# Patient Record
Sex: FEMALE | Race: WHITE | NOT HISPANIC OR LATINO
[De-identification: names, ages, dates, MRNs, and addresses within clinical notes are randomized per-mention and may not be internally consistent; named-entity substitution may affect disease eponyms.]

---

## 2017-11-26 ENCOUNTER — TRANSCRIPTION ENCOUNTER (OUTPATIENT)
Age: 27
End: 2017-11-26

## 2018-11-18 ENCOUNTER — TRANSCRIPTION ENCOUNTER (OUTPATIENT)
Age: 28
End: 2018-11-18

## 2020-11-04 ENCOUNTER — EMERGENCY (EMERGENCY)
Facility: HOSPITAL | Age: 30
LOS: 0 days | Discharge: HOME | End: 2020-11-04
Payer: COMMERCIAL

## 2020-11-04 VITALS
RESPIRATION RATE: 16 BRPM | OXYGEN SATURATION: 99 % | TEMPERATURE: 99 F | SYSTOLIC BLOOD PRESSURE: 114 MMHG | HEART RATE: 77 BPM | DIASTOLIC BLOOD PRESSURE: 64 MMHG

## 2020-11-04 DIAGNOSIS — Z20.828 CONTACT WITH AND (SUSPECTED) EXPOSURE TO OTHER VIRAL COMMUNICABLE DISEASES: ICD-10-CM

## 2020-11-04 LAB — SARS-COV-2 RNA SPEC QL NAA+PROBE: SIGNIFICANT CHANGE UP

## 2020-11-04 PROCEDURE — 99283 EMERGENCY DEPT VISIT LOW MDM: CPT

## 2020-11-04 PROCEDURE — U0003: CPT

## 2020-11-04 NOTE — ED STATDOCS - PATIENT PORTAL LINK FT
You can access the FollowMyHealth Patient Portal offered by [facility] by registering at the following website: [email or web address]. By joining [facility]’s FollowMyHealth portal, you will also be able to view your health information using other applications (apps) compatible with our system.

## 2020-11-04 NOTE — ED ADULT TRIAGE NOTE - CAS ELECT INFOMATION PROVIDED
DC instructions/DISCHARGE INSTRUCTIONS REVIEWED WITH PATIENT VERBALLY, PT VERBALIZED UNDERSTANDING OF DISCHARGE INSTRUCTIONS. PAPER COPY OF DISCHARGE INSTRUCTIONS GIVEN TO PATIENT WITH SELF FELISHA AND COVID 19 INFORMATION.

## 2020-11-04 NOTE — ED ADULT TRIAGE NOTE - CHIEF COMPLAINT QUOTE
Patient requesting covid testing for travel return  PATIENT WAS TREATED, EVALUATED AND DISCHARGED BY INTAKE PROVIDER. PLEASE SEE PROVIDER NOTE FOR ASSESSMENT.

## 2020-12-02 ENCOUNTER — OUTPATIENT (OUTPATIENT)
Dept: OUTPATIENT SERVICES | Facility: HOSPITAL | Age: 30
LOS: 1 days | End: 2020-12-02
Payer: COMMERCIAL

## 2020-12-02 DIAGNOSIS — Z11.59 ENCOUNTER FOR SCREENING FOR OTHER VIRAL DISEASES: ICD-10-CM

## 2020-12-02 PROBLEM — Z78.9 OTHER SPECIFIED HEALTH STATUS: Chronic | Status: ACTIVE | Noted: 2020-11-04

## 2020-12-02 LAB — SARS-COV-2 RNA SPEC QL NAA+PROBE: SIGNIFICANT CHANGE UP

## 2020-12-02 PROCEDURE — U0003: CPT

## 2020-12-03 DIAGNOSIS — Z11.59 ENCOUNTER FOR SCREENING FOR OTHER VIRAL DISEASES: ICD-10-CM

## 2021-02-13 ENCOUNTER — APPOINTMENT (OUTPATIENT)
Dept: RADIOLOGY | Facility: CLINIC | Age: 31
End: 2021-02-13
Payer: MEDICAID

## 2021-02-13 ENCOUNTER — TRANSCRIPTION ENCOUNTER (OUTPATIENT)
Age: 31
End: 2021-02-13

## 2021-02-13 ENCOUNTER — OUTPATIENT (OUTPATIENT)
Dept: OUTPATIENT SERVICES | Facility: HOSPITAL | Age: 31
LOS: 1 days | End: 2021-02-13
Payer: MEDICAID

## 2021-02-13 DIAGNOSIS — S29.9XXA UNSPECIFIED INJURY OF THORAX, INITIAL ENCOUNTER: ICD-10-CM

## 2021-02-13 PROCEDURE — 71100 X-RAY EXAM RIBS UNI 2 VIEWS: CPT

## 2021-02-13 PROCEDURE — 71100 X-RAY EXAM RIBS UNI 2 VIEWS: CPT | Mod: 26,LT

## 2021-02-19 ENCOUNTER — TRANSCRIPTION ENCOUNTER (OUTPATIENT)
Age: 31
End: 2021-02-19

## 2021-05-07 ENCOUNTER — NON-APPOINTMENT (OUTPATIENT)
Age: 31
End: 2021-05-07

## 2021-07-06 ENCOUNTER — APPOINTMENT (OUTPATIENT)
Dept: OPHTHALMOLOGY | Facility: CLINIC | Age: 31
End: 2021-07-06
Payer: MEDICAID

## 2021-07-06 ENCOUNTER — NON-APPOINTMENT (OUTPATIENT)
Age: 31
End: 2021-07-06

## 2021-07-06 PROCEDURE — 92015 DETERMINE REFRACTIVE STATE: CPT

## 2021-07-06 PROCEDURE — 92014 COMPRE OPH EXAM EST PT 1/>: CPT

## 2021-12-11 ENCOUNTER — TRANSCRIPTION ENCOUNTER (OUTPATIENT)
Age: 31
End: 2021-12-11

## 2022-07-24 ENCOUNTER — NON-APPOINTMENT (OUTPATIENT)
Age: 32
End: 2022-07-24

## 2023-04-07 ENCOUNTER — APPOINTMENT (OUTPATIENT)
Dept: OPHTHALMOLOGY | Facility: CLINIC | Age: 33
End: 2023-04-07

## 2023-05-31 ENCOUNTER — NON-APPOINTMENT (OUTPATIENT)
Age: 33
End: 2023-05-31

## 2023-06-23 ENCOUNTER — NON-APPOINTMENT (OUTPATIENT)
Age: 33
End: 2023-06-23

## 2023-06-23 ENCOUNTER — APPOINTMENT (OUTPATIENT)
Dept: OPHTHALMOLOGY | Facility: CLINIC | Age: 33
End: 2023-06-23
Payer: COMMERCIAL

## 2023-06-23 PROCEDURE — 92014 COMPRE OPH EXAM EST PT 1/>: CPT

## 2024-09-21 ENCOUNTER — EMERGENCY (EMERGENCY)
Facility: HOSPITAL | Age: 34
LOS: 1 days | Discharge: PSYCHIATRIC OR SUBSTANCE USE FACILITY | End: 2024-09-21
Attending: EMERGENCY MEDICINE
Payer: SELF-PAY

## 2024-09-21 VITALS
SYSTOLIC BLOOD PRESSURE: 114 MMHG | HEART RATE: 97 BPM | HEIGHT: 64 IN | DIASTOLIC BLOOD PRESSURE: 83 MMHG | OXYGEN SATURATION: 97 % | RESPIRATION RATE: 19 BRPM | WEIGHT: 169.98 LBS | TEMPERATURE: 99 F

## 2024-09-21 LAB
ALBUMIN SERPL ELPH-MCNC: 4.7 G/DL — SIGNIFICANT CHANGE UP (ref 3.3–5)
ALP SERPL-CCNC: 61 U/L — SIGNIFICANT CHANGE UP (ref 40–120)
ALT FLD-CCNC: 17 U/L — SIGNIFICANT CHANGE UP (ref 10–45)
AMPHET UR-MCNC: NEGATIVE — SIGNIFICANT CHANGE UP
ANION GAP SERPL CALC-SCNC: 16 MMOL/L — SIGNIFICANT CHANGE UP (ref 5–17)
APAP SERPL-MCNC: <15 UG/ML — SIGNIFICANT CHANGE UP (ref 10–30)
APPEARANCE UR: ABNORMAL
AST SERPL-CCNC: 17 U/L — SIGNIFICANT CHANGE UP (ref 10–40)
BACTERIA # UR AUTO: ABNORMAL /HPF
BARBITURATES UR SCN-MCNC: NEGATIVE — SIGNIFICANT CHANGE UP
BASOPHILS # BLD AUTO: 0 K/UL — SIGNIFICANT CHANGE UP (ref 0–0.2)
BASOPHILS NFR BLD AUTO: 0 % — SIGNIFICANT CHANGE UP (ref 0–2)
BENZODIAZ UR-MCNC: NEGATIVE — SIGNIFICANT CHANGE UP
BILIRUB SERPL-MCNC: 0.9 MG/DL — SIGNIFICANT CHANGE UP (ref 0.2–1.2)
BILIRUB UR-MCNC: NEGATIVE — SIGNIFICANT CHANGE UP
BUN SERPL-MCNC: 18 MG/DL — SIGNIFICANT CHANGE UP (ref 7–23)
CALCIUM SERPL-MCNC: 10.1 MG/DL — SIGNIFICANT CHANGE UP (ref 8.4–10.5)
CAST: 18 /LPF — HIGH (ref 0–4)
CHLORIDE SERPL-SCNC: 104 MMOL/L — SIGNIFICANT CHANGE UP (ref 96–108)
CO2 SERPL-SCNC: 18 MMOL/L — LOW (ref 22–31)
COCAINE METAB.OTHER UR-MCNC: NEGATIVE — SIGNIFICANT CHANGE UP
COLOR SPEC: YELLOW — SIGNIFICANT CHANGE UP
CREAT SERPL-MCNC: 0.82 MG/DL — SIGNIFICANT CHANGE UP (ref 0.5–1.3)
DIFF PNL FLD: NEGATIVE — SIGNIFICANT CHANGE UP
EGFR: 97 ML/MIN/1.73M2 — SIGNIFICANT CHANGE UP
EOSINOPHIL # BLD AUTO: 0.13 K/UL — SIGNIFICANT CHANGE UP (ref 0–0.5)
EOSINOPHIL NFR BLD AUTO: 0.9 % — SIGNIFICANT CHANGE UP (ref 0–6)
ETHANOL SERPL-MCNC: <10 MG/DL — SIGNIFICANT CHANGE UP (ref 0–10)
FLUAV AG NPH QL: SIGNIFICANT CHANGE UP
FLUBV AG NPH QL: SIGNIFICANT CHANGE UP
GLUCOSE SERPL-MCNC: 148 MG/DL — HIGH (ref 70–99)
GLUCOSE UR QL: NEGATIVE MG/DL — SIGNIFICANT CHANGE UP
HCG UR QL: NEGATIVE — SIGNIFICANT CHANGE UP
HCT VFR BLD CALC: 41 % — SIGNIFICANT CHANGE UP (ref 34.5–45)
HGB BLD-MCNC: 13.8 G/DL — SIGNIFICANT CHANGE UP (ref 11.5–15.5)
HYALINE CASTS # UR AUTO: PRESENT
KETONES UR-MCNC: 40 MG/DL
LEUKOCYTE ESTERASE UR-ACNC: ABNORMAL
LYMPHOCYTES # BLD AUTO: 36.2 % — SIGNIFICANT CHANGE UP (ref 13–44)
LYMPHOCYTES # BLD AUTO: 5.32 K/UL — HIGH (ref 1–3.3)
MANUAL SMEAR VERIFICATION: SIGNIFICANT CHANGE UP
MCHC RBC-ENTMCNC: 28.9 PG — SIGNIFICANT CHANGE UP (ref 27–34)
MCHC RBC-ENTMCNC: 33.7 GM/DL — SIGNIFICANT CHANGE UP (ref 32–36)
MCV RBC AUTO: 85.8 FL — SIGNIFICANT CHANGE UP (ref 80–100)
METHADONE UR-MCNC: NEGATIVE — SIGNIFICANT CHANGE UP
MONOCYTES # BLD AUTO: 0.76 K/UL — SIGNIFICANT CHANGE UP (ref 0–0.9)
MONOCYTES NFR BLD AUTO: 5.2 % — SIGNIFICANT CHANGE UP (ref 2–14)
MUCOUS THREADS # UR AUTO: PRESENT
NEUTROPHILS # BLD AUTO: 8.48 K/UL — HIGH (ref 1.8–7.4)
NEUTROPHILS NFR BLD AUTO: 57.7 % — SIGNIFICANT CHANGE UP (ref 43–77)
NITRITE UR-MCNC: NEGATIVE — SIGNIFICANT CHANGE UP
OPIATES UR-MCNC: NEGATIVE — SIGNIFICANT CHANGE UP
OXYCODONE UR-MCNC: NEGATIVE — SIGNIFICANT CHANGE UP
PCP SPEC-MCNC: SIGNIFICANT CHANGE UP
PCP UR-MCNC: NEGATIVE — SIGNIFICANT CHANGE UP
PH UR: 6 — SIGNIFICANT CHANGE UP (ref 5–8)
PLAT MORPH BLD: ABNORMAL
PLATELET # BLD AUTO: 215 K/UL — SIGNIFICANT CHANGE UP (ref 150–400)
POTASSIUM SERPL-MCNC: 3.4 MMOL/L — LOW (ref 3.5–5.3)
POTASSIUM SERPL-SCNC: 3.4 MMOL/L — LOW (ref 3.5–5.3)
PROT SERPL-MCNC: 7.8 G/DL — SIGNIFICANT CHANGE UP (ref 6–8.3)
PROT UR-MCNC: SIGNIFICANT CHANGE UP MG/DL
RBC # BLD: 4.78 M/UL — SIGNIFICANT CHANGE UP (ref 3.8–5.2)
RBC # FLD: 12.4 % — SIGNIFICANT CHANGE UP (ref 10.3–14.5)
RBC BLD AUTO: SIGNIFICANT CHANGE UP
RBC CASTS # UR COMP ASSIST: 10 /HPF — HIGH (ref 0–4)
REVIEW: SIGNIFICANT CHANGE UP
RSV RNA NPH QL NAA+NON-PROBE: SIGNIFICANT CHANGE UP
SALICYLATES SERPL-MCNC: <2 MG/DL — LOW (ref 15–30)
SARS-COV-2 RNA SPEC QL NAA+PROBE: SIGNIFICANT CHANGE UP
SODIUM SERPL-SCNC: 138 MMOL/L — SIGNIFICANT CHANGE UP (ref 135–145)
SP GR SPEC: 1.03 — SIGNIFICANT CHANGE UP (ref 1–1.03)
SQUAMOUS # UR AUTO: 8 /HPF — HIGH (ref 0–5)
THC UR QL: NEGATIVE — SIGNIFICANT CHANGE UP
UROBILINOGEN FLD QL: 1 MG/DL — SIGNIFICANT CHANGE UP (ref 0.2–1)
WAXY CASTS # URNS: PRESENT
WBC # BLD: 14.7 K/UL — HIGH (ref 3.8–10.5)
WBC # FLD AUTO: 14.7 K/UL — HIGH (ref 3.8–10.5)
WBC UR QL: 8 /HPF — HIGH (ref 0–5)

## 2024-09-21 PROCEDURE — 84443 ASSAY THYROID STIM HORMONE: CPT

## 2024-09-21 PROCEDURE — 87637 SARSCOV2&INF A&B&RSV AMP PRB: CPT

## 2024-09-21 PROCEDURE — 70450 CT HEAD/BRAIN W/O DYE: CPT | Mod: MC

## 2024-09-21 PROCEDURE — 85025 COMPLETE CBC W/AUTO DIFF WBC: CPT

## 2024-09-21 PROCEDURE — 80053 COMPREHEN METABOLIC PANEL: CPT

## 2024-09-21 PROCEDURE — 81001 URINALYSIS AUTO W/SCOPE: CPT

## 2024-09-21 PROCEDURE — 99285 EMERGENCY DEPT VISIT HI MDM: CPT | Mod: 25

## 2024-09-21 PROCEDURE — 93005 ELECTROCARDIOGRAM TRACING: CPT

## 2024-09-21 PROCEDURE — 80307 DRUG TEST PRSMV CHEM ANLYZR: CPT

## 2024-09-21 PROCEDURE — 81025 URINE PREGNANCY TEST: CPT

## 2024-09-21 PROCEDURE — 99285 EMERGENCY DEPT VISIT HI MDM: CPT
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
## 2024-09-21 NOTE — ED PROVIDER NOTE - OBJECTIVE STATEMENT
34-year-old female, with history of depression, presenting with behavioral changes that have been progressively worsening.  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  Patient has been disorganized and agitated at home. Has not been eating and drinking well per parents. Worsening this week.
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
## 2024-09-21 NOTE — ED BEHAVIORAL HEALTH ASSESSMENT NOTE - CURRENT ACTIVE IDEATION
Reason for Call:  Other call back    Detailed comments: patient is calling because she would like to know if she needs an office visit to get refills on medications. Please follow up with patient.     Phone Number Patient can be reached at: Cell number on file:    Telephone Information:   Mobile [phone]       Best Time: any    Can we leave a detailed message on this number? YES    Call taken on 6/22/2021 at 9:13 AM by Carolyn Moses       No
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
## 2024-09-21 NOTE — ED PROVIDER NOTE - CARE PLAN
1 Principal Discharge DX:	Physically aggressive behavior  Secondary Diagnosis:	Disorganized behavior

## 2024-09-21 NOTE — ED ADULT TRIAGE NOTE - CHIEF COMPLAINT QUOTE
Verbally  disruptive  at home,  patient  has  not  been  taking  her  psych  medication. Patient  denies  suicidal ideations.

## 2024-09-21 NOTE — ED BEHAVIORAL HEALTH ASSESSMENT NOTE - NSBHSAALC_PSY_A_CORE FT
says she used to drink in the past but quantifies as 2 glasses of wine, also affirms drinking a whole bottle of wine a night, disorganized

## 2024-09-21 NOTE — ED PROVIDER NOTE - PHYSICAL EXAMINATION
GENERAL: NAD  HEAD:  Atraumatic, Normocephalic  EYES: EOMI, conjunctiva and sclera clear  ENT: Moist mucous membranes  CHEST/LUNG: Unlabored respirations.  EXTREMITIES:  No cyanosis or edema.   NERVOUS SYSTEM:  No focal deficits.   PSYCH: poor eye contact, intermittently answers questions directly, intermittently disorganized speech. Pacing

## 2024-09-21 NOTE — ED BEHAVIORAL HEALTH ASSESSMENT NOTE - PSYCHIATRIC ISSUES AND PLAN (INCLUDE STANDING AND PRN MEDICATION)
deferred to inpt team, consider starting aripiprazole 2 mg qHS and thorazine 50 mg / ativan 2 mg q6h PO/IM PRN for agitation/anxiety

## 2024-09-21 NOTE — ED BEHAVIORAL HEALTH ASSESSMENT NOTE - LEVEL OF CONSCIOUSNESS
Azithromycin Pregnancy And Lactation Text: This medication is considered safe during pregnancy and is also secreted in breast milk. Alert
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
## 2024-09-21 NOTE — ED BEHAVIORAL HEALTH ASSESSMENT NOTE - RISK ASSESSMENT
Pt is at chronically elevated risk of suicide and violence due to documented risk factors and presenting with acute risk factors as identified to be addressed via treatment.

## 2024-09-21 NOTE — ED ADULT NURSE NOTE - NSFALLUNIVINTERV_ED_ALL_ED
Bed/Stretcher in lowest position, wheels locked, appropriate side rails in place/Call bell, personal items and telephone in reach/Instruct patient to call for assistance before getting out of bed/chair/stretcher/Non-slip footwear applied when patient is off stretcher/[place] to call system/Physically safe environment - no spills, clutter or unnecessary equipment/Purposeful proactive rounding/Room/bathroom lighting operational, light cord in reach

## 2024-09-21 NOTE — ED BEHAVIORAL HEALTH ASSESSMENT NOTE - DESCRIPTION
disorganized, pacing    Vital Signs Last 24 Hrs  T(C): 36.9 (09-21-24 @ 21:16), Max: 37.2 (09-21-24 @ 16:23)  T(F): 98.4 (09-21-24 @ 21:16), Max: 99 (09-21-24 @ 16:23)  HR: 96 (09-21-24 @ 21:16) (96 - 97)  BP: 108/77 (09-21-24 @ 21:16) (108/77 - 114/83)  BP(mean): --  RR: 18 (09-21-24 @ 21:16) (18 - 19)  SpO2: 98% (09-21-24 @ 21:16) (97% - 98%) none reported as above

## 2024-09-22 ENCOUNTER — INPATIENT (INPATIENT)
Facility: HOSPITAL | Age: 34
LOS: 11 days | Discharge: HOME | End: 2024-10-04
Attending: STUDENT IN AN ORGANIZED HEALTH CARE EDUCATION/TRAINING PROGRAM | Admitting: PSYCHIATRY & NEUROLOGY
Payer: MEDICAID

## 2024-09-22 VITALS
TEMPERATURE: 98 F | HEART RATE: 69 BPM | SYSTOLIC BLOOD PRESSURE: 125 MMHG | DIASTOLIC BLOOD PRESSURE: 88 MMHG | OXYGEN SATURATION: 98 % | RESPIRATION RATE: 18 BRPM

## 2024-09-22 VITALS — RESPIRATION RATE: 17 BRPM | WEIGHT: 147.71 LBS | TEMPERATURE: 98 F | OXYGEN SATURATION: 99 %

## 2024-09-22 DIAGNOSIS — F31.62 BIPOLAR DISORDER, CURRENT EPISODE MIXED, MODERATE: ICD-10-CM

## 2024-09-22 DIAGNOSIS — F29 UNSPECIFIED PSYCHOSIS NOT DUE TO A SUBSTANCE OR KNOWN PHYSIOLOGICAL CONDITION: ICD-10-CM

## 2024-09-22 LAB — TSH SERPL-MCNC: 1.42 UIU/ML — SIGNIFICANT CHANGE UP (ref 0.27–4.2)

## 2024-09-22 PROCEDURE — 90792 PSYCH DIAG EVAL W/MED SRVCS: CPT | Mod: 95

## 2024-09-22 PROCEDURE — 70450 CT HEAD/BRAIN W/O DYE: CPT | Mod: 26,MC

## 2024-09-22 RX ORDER — ARIPIPRAZOLE 5 MG/1
5 TABLET ORAL AT BEDTIME
Refills: 0 | Status: DISCONTINUED | OUTPATIENT
Start: 2024-09-22 | End: 2024-09-23

## 2024-09-22 RX ORDER — MAG HYDROX/ALUMINUM HYD/SIMETH 200-200-20
30 SUSPENSION, ORAL (FINAL DOSE FORM) ORAL EVERY 4 HOURS
Refills: 0 | Status: DISCONTINUED | OUTPATIENT
Start: 2024-09-22 | End: 2024-10-04

## 2024-09-22 RX ORDER — HALOPERIDOL LACTATE 2 MG/ML
5 CONCENTRATE, ORAL ORAL EVERY 6 HOURS
Refills: 0 | Status: DISCONTINUED | OUTPATIENT
Start: 2024-09-22 | End: 2024-09-24

## 2024-09-22 RX ORDER — DIPHENHYDRAMINE HCL 12.5MG/5ML
25 LIQUID (ML) ORAL EVERY 6 HOURS
Refills: 0 | Status: DISCONTINUED | OUTPATIENT
Start: 2024-09-22 | End: 2024-09-23

## 2024-09-22 RX ORDER — INFLUENZA VIRUS VACCINE 15; 15; 15; 15 UG/.5ML; UG/.5ML; UG/.5ML; UG/.5ML
0.5 SUSPENSION INTRAMUSCULAR ONCE
Refills: 0 | Status: COMPLETED | OUTPATIENT
Start: 2024-09-22 | End: 2024-09-22

## 2024-09-22 RX ORDER — ACETAMINOPHEN 325 MG
650 TABLET ORAL EVERY 6 HOURS
Refills: 0 | Status: DISCONTINUED | OUTPATIENT
Start: 2024-09-22 | End: 2024-10-04

## 2024-09-22 RX ORDER — 5-HYDROXYTRYPTOPHAN (5-HTP) 100 MG
3 TABLET,DISINTEGRATING ORAL AT BEDTIME
Refills: 0 | Status: DISCONTINUED | OUTPATIENT
Start: 2024-09-22 | End: 2024-10-04

## 2024-09-22 RX ORDER — HALOPERIDOL LACTATE 2 MG/ML
5 CONCENTRATE, ORAL ORAL ONCE
Refills: 0 | Status: DISCONTINUED | OUTPATIENT
Start: 2024-09-22 | End: 2024-10-04

## 2024-09-22 RX ORDER — DIPHENHYDRAMINE HCL 12.5MG/5ML
25 LIQUID (ML) ORAL ONCE
Refills: 0 | Status: DISCONTINUED | OUTPATIENT
Start: 2024-09-22 | End: 2024-09-23

## 2024-09-22 RX ADMIN — Medication 2 MILLIGRAM(S): at 07:06

## 2024-09-22 RX ADMIN — ARIPIPRAZOLE 5 MILLIGRAM(S): 5 TABLET ORAL at 20:11

## 2024-09-22 RX ADMIN — Medication 5 MILLIGRAM(S): at 07:06

## 2024-09-22 NOTE — BH PATIENT PROFILE - FALL HARM RISK - CONCLUSION
Universal Safety Interventions Spoke to ENT - rec'd treating with augmentin and f/u w/ ENT in 1 week. Nasal spray administered in the ED and given to family.   Linda Linn MD Small plastic cellphone chip retrieved from left nostril using Velázquez extractor. Spoke to ENT - rec'd treating with augmentin and f/u w/ ENT in 1 week. Nasal spray administered in the ED and given to family.   Linda Linn MD
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
## 2024-09-22 NOTE — PSYCHIATRIC REHAB INITIAL EVALUATION - NSBHPRRECOMMEND_PSY_ALL_CORE
Patient is a 34-year-old female admitted to [facility] for agitation and disorganization at home. Upon initial session, patient was calm/compliant and friendly upon approach. Patient was oriented to unit 3 as well as the role/function of the psychiatric rehab department. Patient verbalized understanding of the services provided as it pertains to engagement and experience.   Patient reported feeling “overwhelmed and stressed out” due to various life related factors and displayed a tired flat affect. Patient detailed difficulties with finding motivation to perform executive functioning tasks. Collateral information in patient’s chart indicated that she had been presenting with psychosis like symptoms and agitation. During session, patient’s tone of voice was often low and hard to make out in addition to presenting with pressured speech and disorganization. Patient listed cooking, exercising on a regular basis, and hanging out with friends as current coping skills to utilize.   Based on content of conversation, writer determined that patient would benefit from a treatment goal focused on disorganization symptoms.  Psychiatric Rehabilitation Staff plan to meet with patient weekly to review progress towards identified goal.

## 2024-09-22 NOTE — BH INPATIENT PSYCHIATRY ASSESSMENT NOTE - NSICDXBHTERTIARYDX_PSY_ALL_CORE
R/O Bipolar 1 disorder   F31.9  R/O Major depression with psychotic features   F32.3  R/O Severe recurrent major depression without psychotic features   F33.2

## 2024-09-22 NOTE — BH INPATIENT PSYCHIATRY ASSESSMENT NOTE - NSBHCHARTREVIEWVS_PSY_A_CORE FT
Vital Signs Last 24 Hrs  T(C): 36.6 (09-22-24 @ 05:47), Max: 37.2 (09-21-24 @ 16:23)  T(F): 97.8 (09-22-24 @ 05:47), Max: 99 (09-21-24 @ 16:23)  HR: 69 (09-22-24 @ 04:42) (69 - 97)  BP: 125/88 (09-22-24 @ 04:42) (108/77 - 144/93)  BP(mean): 110 (09-22-24 @ 01:04) (110 - 110)  RR: 17 (09-22-24 @ 05:47) (17 - 19)  SpO2: 99% (09-22-24 @ 05:47) (97% - 99%)    Orthostatic VS  09-22-24 @ 05:47  Lying BP: --/-- HR: --  Sitting BP: 140/90 HR: 90  Standing BP: 129/101 HR: 85  Site: upper left arm  Mode: electronic

## 2024-09-22 NOTE — ED ADULT NURSE REASSESSMENT NOTE - NS ED NURSE REASSESS COMMENT FT1
pt. has been accepted @ [place] [address] on a voluntary status and report given MONIKA Matias. [facility] ems was called for transport. 1:1 CO for psychosis/disorganized behavior maintained. pt. was made aware of pending transfer via ems to [place] [address].
pt. was transferred to [facility], [facility] on a voluntary status via ems and she was cooperative w/ transfer. final report given to MONIKA Matias regarding pt's current mental status and behavior. pt's mother was notified.
received pt. awake, labile and irritable @ times, but was cooperative w/ blood draw. 1:1 CO for psychosis maintained.
5
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
## 2024-09-22 NOTE — BH INPATIENT PSYCHIATRY ASSESSMENT NOTE - NSBHATTESTATTENDBILLTIME_PSY_A_CORE
I attest my time as attending is greater than NIRU time spent on qualifying patient care activities.
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
## 2024-09-22 NOTE — CHART NOTE - NSCHARTNOTEFT_GEN_A_CORE
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 Patient denies current use of nicotine/ tobacco, or other substances.    FAMILY HISTORY:  No pertinent family history in first degree relatives      MEDICATIONS  (STANDING):  ARIPiprazole 5 milliGRAM(s) Oral at bedtime  influenza   Vaccine 0.5 milliLiter(s) IntraMuscular once    MEDICATIONS  (PRN):  acetaminophen     Tablet .. 650 milliGRAM(s) Oral every 6 hours PRN Mild Pain (1 - 3), Moderate Pain (4 - 6)  aluminum hydroxide/magnesium hydroxide/simethicone Suspension 30 milliLiter(s) Oral every 4 hours PRN Dyspepsia  diphenhydrAMINE 25 milliGRAM(s) Oral every 6 hours PRN agitation  diphenhydrAMINE Injectable 25 milliGRAM(s) IntraMuscular once PRN severe agitation  haloperidol     Tablet 5 milliGRAM(s) Oral every 6 hours PRN psychotic agitation  haloperidol    Injectable 5 milliGRAM(s) IntraMuscular once PRN severe psychotic agitation  LORazepam     Tablet 2 milliGRAM(s) Oral every 6 hours PRN agitation  LORazepam   Injectable 2 milliGRAM(s) IntraMuscular once PRN severe agitation  melatonin. 3 milliGRAM(s) Oral at bedtime PRN Insomnia  polyethylene glycol 3350 17 Gram(s) Oral daily PRN constipation        Vital Signs Last 24 Hrs  T(C): 36.6 (22 Sep 2024 05:47), Max: 36.8 (22 Sep 2024 01:04)  T(F): 97.8 (22 Sep 2024 05:47), Max: 98.2 (22 Sep 2024 01:04)  HR: 69 (22 Sep 2024 04:42) (69 - 71)  BP: 125/88 (22 Sep 2024 04:42) (125/88 - 144/93)  BP(mean): 110 (22 Sep 2024 01:04) (110 - 110)  RR: 17 (22 Sep 2024 05:47) (17 - 18)  SpO2: 99% (22 Sep 2024 05:47) (98% - 99%)      CAPILLARY BLOOD GLUCOSE        PHYSICAL EXAM:  GENERAL: NAD, well-developed  HEAD:  Atraumatic, Normocephalic  EYES: EOMI, PERRLA, conjunctiva and sclera clear  NECK: Supple, No JVD  CHEST/LUNG: Clear to auscultation bilaterally; No wheeze  HEART: Regular rate and rhythm; No murmurs, rubs, or gallops  ABDOMEN: Soft, Nontender, Nondistended; Bowel sounds present  EXTREMITIES:  2+ Peripheral Pulses, No clubbing, cyanosis, or edema  PSYCH: AAOx3  NEUROLOGY: non-focal  SKIN: No rashes or lesions    LABS:                        13.8   14.70 )-----------( 215      ( 21 Sep 2024 20:15 )             41.0         138  |  104  |  18  ----------------------------<  148[H]  3.4[L]   |  18[L]  |  0.82    Ca    10.1      21 Sep 2024 20:15    TPro  7.8  /  Alb  4.7  /  TBili  0.9  /  DBili  x   /  AST  17  /  ALT  17  /  AlkPhos  61            Urinalysis Basic - ( 21 Sep 2024 22:18 )    Color: Yellow / Appearance: Cloudy / S.028 / pH: x  Gluc: x / Ketone: 40 mg/dL  / Bili: Negative / Urobili: 1.0 mg/dL   Blood: x / Protein: Trace mg/dL / Nitrite: Negative   Leuk Esterase: Trace / RBC: 10 /HPF / WBC 8 /HPF   Sq Epi: x / Non Sq Epi: 8 /HPF / Bacteria: Moderate /HPF        RADIOLOGY & ADDITIONAL TESTS:      Assessment and Plan:  34F admitted to [facility] for Non-organic psychosis w/ no PMHx seen at bedside for medical screening evaluation. Patient has no acute medical complaints at this time. Patient denies fever, chills, headache, dizziness, lightheadedness, N/V, SOB, cough, congestion, chest pain, abdominal pain, dysuria, hematuria, diarrhea, constipation. Physical exam unremarkable, VSS. Labs on 24 with elevated WBC 14.70, potassium 3.4; other labs WNL. UA contaminated, will order repeat UA for morning. A1C, CBC, and lipid profile ordered.  F/u EKG in AM.     1.) Non-organic psychosis: Refer to primary team documentation for recs

## 2024-09-22 NOTE — BH INPATIENT PSYCHIATRY ASSESSMENT NOTE - CURRENT MEDICATION
MEDICATIONS  (STANDING):  ARIPiprazole 5 milliGRAM(s) Oral at bedtime  influenza   Vaccine 0.5 milliLiter(s) IntraMuscular once    MEDICATIONS  (PRN):  acetaminophen     Tablet .. 650 milliGRAM(s) Oral every 6 hours PRN Mild Pain (1 - 3), Moderate Pain (4 - 6)  aluminum hydroxide/magnesium hydroxide/simethicone Suspension 30 milliLiter(s) Oral every 4 hours PRN Dyspepsia  diphenhydrAMINE 25 milliGRAM(s) Oral every 6 hours PRN agitation  diphenhydrAMINE Injectable 25 milliGRAM(s) IntraMuscular once PRN severe agitation  haloperidol     Tablet 5 milliGRAM(s) Oral every 6 hours PRN psychotic agitation  haloperidol    Injectable 5 milliGRAM(s) IntraMuscular once PRN severe psychotic agitation  LORazepam     Tablet 2 milliGRAM(s) Oral every 6 hours PRN agitation  LORazepam   Injectable 2 milliGRAM(s) IntraMuscular once PRN severe agitation  melatonin. 3 milliGRAM(s) Oral at bedtime PRN Insomnia  polyethylene glycol 3350 17 Gram(s) Oral daily PRN constipation

## 2024-09-22 NOTE — BH INPATIENT PSYCHIATRY ASSESSMENT NOTE - NSBHMSETHTPROC_PSY_A_CORE
Chief Complaint   Patient presents with    Hypertension    Other     f/u for elevated psa     Cyst     cyst on left thumb       Palmer Dee 69 y.o. male is here for follow-up of hypertension. The patient denies chest pain, chest tightness, shortness of breath or other cardiovascular symptoms suggesting end organ damage from their hypertension. There has been compliance to medications     He had an elevated PSA on his last visit and needs a repeat    He complains of a cyst on his left thumb    Current Outpatient Medications   Medication Sig Dispense Refill    lisinopril-hydrochlorothiazide (PRINZIDE;ZESTORETIC) 20-12.5 MG per tablet Take 1 tablet by mouth daily 90 tablet 3    Multiple Vitamins-Minerals (THERAPEUTIC MULTIVITAMIN-MINERALS) tablet Take 1 tablet by mouth daily.  aspirin 81 MG EC tablet Take 81 mg by mouth daily. No current facility-administered medications for this visit.         Past Medical History:   Diagnosis Date    Abdominal pain, RUQ     Cervical strain     Gastrointestinal bleed     History of shingles     dx [place] [facility] CNP    Hypertension     Obstructive apnea     Obstructive sleep apnea (adult) (pediatric)     Screening PSA (prostate specific antigen) 10/22/2010    Stool blood 9/18/2009           /70   Pulse 72   Wt 266 lb (120.7 kg)   BMI 38.72 kg/m²     General Appearance:  Alert, cooperative, no distress, appears stated age   Head:  Normocephalic, without obvious abnormality, atraumatic   Neck: Supple, symmetrical, trachea midline, no adenopathy, thyroid: not enlarged, symmetric, no tenderness/mass/nodules, no carotid bruit or JVD   Lungs:   Clear to auscultation bilaterally, respirations unlabored   Chest Wall:  No tenderness or deformity   Heart:  Regular rate and rhythm, S1, S2 normal, no murmur, rub or gallop   Abdomen:   Soft, non-tender, bowel sounds active all four quadrants,  no masses, no organomegaly   Skin: Skin color, texture, turgor Other/Unable to assess

## 2024-09-22 NOTE — BH INPATIENT PSYCHIATRY ASSESSMENT NOTE - NS ED BHA HOMICIDALITY PRESENT CURRENT INTENT
EMERGENCY DEPARTMENT HISTORY AND PHYSICAL EXAM    Date: 1/13/2022  Patient Name: Marilee Grimm    History of Presenting Illness     Chief Complaint   Patient presents with    Flank Pain       Vietnamese interpretor used as pt is non-english speaker    History Provided By: Patient    Chief Complaint: left flank pain, LLQ abdominal pain    HPI(Context):   4:42 PM  Susi Grimm is a 62 y.o. female with PMHX of HTN, DMII who presents to the emergency department C/O LLQ abdominal pain. Pain x 2-3 days. Pain radiates from left lumbar back/left flank. Worse with movement and palpation. Pt denies fever, chills, nausea, vomiting, diarrhea, dysuria, hematuria, hx of diverticulitis, COVID exposures, and any other sxs or complaints. Pt is fully vaccinated. Pt notes she is visiting family from [place]    PCP: Scott, MD Odalis        Past History     Past Medical History:  No past medical history on file. Past Surgical History:  No past surgical history on file. Family History:  No family history on file. Social History:  Social History     Tobacco Use    Smoking status: Not on file    Smokeless tobacco: Not on file   Substance Use Topics    Alcohol use: Not on file    Drug use: Not on file       Allergies:  No Known Allergies      Review of Systems   Review of Systems   Constitutional: Negative for chills and fever. Gastrointestinal: Positive for abdominal pain. Negative for constipation, diarrhea, nausea and vomiting. Genitourinary: Negative for dysuria, flank pain and hematuria. Musculoskeletal: Positive for back pain. All other systems reviewed and are negative. Physical Exam     Vitals:    01/13/22 1501   BP: (!) 157/50   Pulse: 62   Resp: 16   Temp: 97.7 °F (36.5 °C)   SpO2: 99%   Weight: 58.5 kg (129 lb)   Height: 4' (1.219 m)     Physical Exam  Vitals and nursing note reviewed. Constitutional:       General: She is not in acute distress.      Appearance: She is well-developed. She is not diaphoretic. Comments:  female in NAD. Alert. HENT:      Head: Normocephalic and atraumatic. Right Ear: External ear normal.      Left Ear: External ear normal. No tenderness. Nose: Nose normal.   Eyes:      General: No scleral icterus. Right eye: No discharge. Left eye: No discharge. Conjunctiva/sclera: Conjunctivae normal.   Cardiovascular:      Rate and Rhythm: Normal rate and regular rhythm. Heart sounds: Normal heart sounds. No murmur heard. No friction rub. No gallop. Pulmonary:      Effort: Pulmonary effort is normal. No tachypnea, accessory muscle usage or respiratory distress. Breath sounds: Normal breath sounds. No decreased breath sounds, wheezing, rhonchi or rales. Abdominal:      Palpations: Abdomen is soft. Tenderness: There is abdominal tenderness in the left lower quadrant. There is no right CVA tenderness, left CVA tenderness, guarding or rebound. Negative signs include Armenta's sign and McBurney's sign. Musculoskeletal:         General: Normal range of motion. Cervical back: Normal range of motion. Skin:     General: Skin is warm and dry. Neurological:      Mental Status: She is alert and oriented to person, place, and time. Psychiatric:         Judgment: Judgment normal.             Diagnostic Study Results     Labs -     No results found for this or any previous visit (from the past 12 hour(s)). CT ABD PELV W CONT   Final Result      No acute intra-abdominal pathology identified. CT Results  (Last 48 hours)               01/13/22 1932  CT ABD PELV W CONT Final result    Impression:      No acute intra-abdominal pathology identified. Narrative:  EXAM: CT of the abdomen and pelvis       INDICATION: Abdominal pain. COMPARISON: None.        TECHNIQUE: Axial CT imaging of the abdomen and pelvis was performed with   intravenous contrast. Multiplanar reformats were generated. One or more dose   reduction techniques were used on this CT: automated exposure control,   adjustment of the mAs and/or kVp according to patient size, and iterative   reconstruction techniques. The specific techniques used on this CT exam have   been documented in the patient's electronic medical record. Digital Imaging and   Communications in Medicine (DICOM) format image data are available to   nonaffiliated external healthcare facilities or entities on a secure, media   free, reciprocally searchable basis with patient authorization for at least a   12-month period after this study. a.       _______________       FINDINGS:       LOWER CHEST: Unremarkable. LIVER, BILIARY: Liver is normal. No biliary dilation. Gallbladder is   unremarkable. PANCREAS: Normal.       SPLEEN: Normal.       ADRENALS: Normal.       KIDNEYS/URETERS/BLADDER: Normal.       PELVIC ORGANS: Unremarkable. VASCULATURE: Unremarkable       LYMPH NODES: No enlarged lymph nodes. GASTROINTESTINAL TRACT: No bowel dilation or wall thickening. Normal appendix. BONES: No acute or aggressive osseous abnormalities identified. OTHER: None.       _______________               CXR Results  (Last 48 hours)    None          Medications given in the ED-  Medications   lactated ringers bolus infusion 1,000 mL (0 mL IntraVENous IV Completed 1/13/22 1908)   ondansetron (ZOFRAN) injection 4 mg (4 mg IntraVENous Given 1/13/22 1808)   morphine injection 4 mg (4 mg IntraVENous Given 1/13/22 1808)   famotidine (PF) (PEPCID) injection 20 mg (20 mg IntraVENous Given 1/13/22 1807)   iopamidoL (ISOVUE 300) 61 % contrast injection 100 mL (100 mL IntraVENous Given 1/13/22 1932)         Medical Decision Making   I am the first provider for this patient. I reviewed the vital signs, available nursing notes, past medical history, past surgical history, family history and social history.     Vital Signs-Reviewed the patient's vital signs.    Pulse Oximetry Analysis - 99% on RA. NORMAL     Records Reviewed: Nursing Notes    Provider Notes (Medical Decision Making): diverticulitis, stone, UTI/pyelo, appy, colitis, gastroenteritis, pancreatitis, hepatitis, MSK    Procedures:  Procedures    ED Course:   4:42 PM Initial assessment performed. The patients presenting problems have been discussed, and they are in agreement with the care plan formulated and outlined with them. I have encouraged them to ask questions as they arise throughout their visit. Diagnosis and Disposition       CT without acute process. Labs reassuring. Pain down to 2/10. Will tx for UTI and await urine culture. Discussed with patient and her daughter ([place] speaker) with  need for FU with GI and colonoscopy if sxs persist as malignancy not ruled out. Reasons to RTED discussed with pt. All questions answered. Pt feels comfortable going home at this time. Pt expressed understanding and she agrees with plan. 1. Abdominal pain, LLQ (left lower quadrant)    2. Acute left flank pain        PLAN:  1. D/C Home  2. Discharge Medication List as of 1/13/2022  8:33 PM      START taking these medications    Details   HYDROcodone-acetaminophen (NORCO) 5-325 mg per tablet Take 1 Tablet by mouth every four (4) hours as needed for Pain for up to 5 days. Max Daily Amount: 6 Tablets., Normal, Disp-12 Tablet, R-0      dicyclomine (BENTYL) 20 mg tablet Take 1 Tablet by mouth every six (6) hours for 5 days. As needed for abdominal pain, Normal, Disp-20 Tablet, R-0      ondansetron hcl (Zofran) 4 mg tablet Take 1 Tablet by mouth every eight (8) hours as needed for Nausea or Vomiting., Normal, Disp-12 Tablet, R-0      cephALEXin (Keflex) 500 mg capsule Take 1 Capsule by mouth two (2) times a day for 7 days. Indications: bacterial urinary tract infection, Normal, Disp-14 Capsule, R-0           3.    Follow-up Information     Follow up With Specialties Details Why Contact Info Laure Calabrese MD Gastroenterology  please follow up and see specialist if symptoms do not resolve [address] [place] [address] [phone]          _______________________________    Attestations: This note is prepared by Wil Zapien PA-C.  _______________________________        Please note that this dictation was completed with [employer], the computer voice recognition software. Quite often unanticipated grammatical, syntax, homophones, and other interpretive errors are inadvertently transcribed by the computer software. Please disregard these errors. Please excuse any errors that have escaped final proofreading. None known

## 2024-09-22 NOTE — BH INPATIENT PSYCHIATRY ASSESSMENT NOTE - RISK ASSESSMENT
Immediate risk is minimized by inpatient admission to safe environment with appropriate supervision and limited access to lethal means. Future risk to be minimized by treatment of acute mood symptoms, maximizing outpatient supports, providing patient education, discussing emergency procedures, and ensuring close follow-up.    Risk factors include: insomnia, increasing agitation and disorganization leading to current hospitalization, not engaged in work or school, h/o inconsistent med adherence  Protective factors include: no current SI/HI, no h/o prior inpatient psychiatric hospitalization, no h/o SA/SIB, no prior h/o violence or substance use, supportive family, no access to firearms    Based on risk assessment evaluation, patient at low acute risk of harm to self or others at this time, does not require 1:1 CO while on the unit at this time.
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
## 2024-09-22 NOTE — BH INPATIENT PSYCHIATRY ASSESSMENT NOTE - NSBHMETABOLIC_PSY_ALL_CORE_FT
BMI: BMI (kg/m2): 29.2 (09-21-24 @ 16:23)  HbA1c:   Glucose:   BP: --Vital Signs Last 24 Hrs  T(C): 36.6 (09-22-24 @ 05:47), Max: 37.2 (09-21-24 @ 16:23)  T(F): 97.8 (09-22-24 @ 05:47), Max: 99 (09-21-24 @ 16:23)  HR: 69 (09-22-24 @ 04:42) (69 - 97)  BP: 125/88 (09-22-24 @ 04:42) (108/77 - 144/93)  BP(mean): 110 (09-22-24 @ 01:04) (110 - 110)  RR: 17 (09-22-24 @ 05:47) (17 - 19)  SpO2: 99% (09-22-24 @ 05:47) (97% - 99%)    Orthostatic VS  09-22-24 @ 05:47  Lying BP: --/-- HR: --  Sitting BP: 140/90 HR: 90  Standing BP: 129/101 HR: 85  Site: upper left arm  Mode: electronic    Lipid Panel:

## 2024-09-23 LAB
A1C WITH ESTIMATED AVERAGE GLUCOSE RESULT: 5.3 % — SIGNIFICANT CHANGE UP (ref 4–5.6)
ALBUMIN SERPL ELPH-MCNC: 4.5 G/DL — SIGNIFICANT CHANGE UP (ref 3.3–5)
ALP SERPL-CCNC: 62 U/L — SIGNIFICANT CHANGE UP (ref 40–120)
ALT FLD-CCNC: 14 U/L — SIGNIFICANT CHANGE UP (ref 4–33)
ANION GAP SERPL CALC-SCNC: 17 MMOL/L — HIGH (ref 7–14)
APPEARANCE UR: ABNORMAL
AST SERPL-CCNC: 16 U/L — SIGNIFICANT CHANGE UP (ref 4–32)
BACTERIA # UR AUTO: ABNORMAL /HPF
BILIRUB SERPL-MCNC: 0.8 MG/DL — SIGNIFICANT CHANGE UP (ref 0.2–1.2)
BILIRUB UR-MCNC: NEGATIVE — SIGNIFICANT CHANGE UP
BUN SERPL-MCNC: 9 MG/DL — SIGNIFICANT CHANGE UP (ref 7–23)
CALCIUM SERPL-MCNC: 10.3 MG/DL — SIGNIFICANT CHANGE UP (ref 8.4–10.5)
CAST: 2 /LPF — SIGNIFICANT CHANGE UP (ref 0–4)
CHLORIDE SERPL-SCNC: 101 MMOL/L — SIGNIFICANT CHANGE UP (ref 98–107)
CHOLEST SERPL-MCNC: 193 MG/DL — SIGNIFICANT CHANGE UP
CO2 SERPL-SCNC: 23 MMOL/L — SIGNIFICANT CHANGE UP (ref 22–31)
COLOR SPEC: YELLOW — SIGNIFICANT CHANGE UP
CREAT SERPL-MCNC: 0.78 MG/DL — SIGNIFICANT CHANGE UP (ref 0.5–1.3)
DIFF PNL FLD: NEGATIVE — SIGNIFICANT CHANGE UP
EGFR: 103 ML/MIN/1.73M2 — SIGNIFICANT CHANGE UP
ESTIMATED AVERAGE GLUCOSE: 105 — SIGNIFICANT CHANGE UP
GLUCOSE SERPL-MCNC: 83 MG/DL — SIGNIFICANT CHANGE UP (ref 70–99)
GLUCOSE UR QL: NEGATIVE MG/DL — SIGNIFICANT CHANGE UP
HCT VFR BLD CALC: 43.9 % — SIGNIFICANT CHANGE UP (ref 34.5–45)
HDLC SERPL-MCNC: 65 MG/DL — SIGNIFICANT CHANGE UP
HGB BLD-MCNC: 14.7 G/DL — SIGNIFICANT CHANGE UP (ref 11.5–15.5)
KETONES UR-MCNC: 15 MG/DL
LEUKOCYTE ESTERASE UR-ACNC: NEGATIVE — SIGNIFICANT CHANGE UP
LIPID PNL WITH DIRECT LDL SERPL: 111 MG/DL — HIGH
MCHC RBC-ENTMCNC: 29.4 PG — SIGNIFICANT CHANGE UP (ref 27–34)
MCHC RBC-ENTMCNC: 33.5 GM/DL — SIGNIFICANT CHANGE UP (ref 32–36)
MCV RBC AUTO: 87.8 FL — SIGNIFICANT CHANGE UP (ref 80–100)
NITRITE UR-MCNC: NEGATIVE — SIGNIFICANT CHANGE UP
NON HDL CHOLESTEROL: 128 MG/DL — SIGNIFICANT CHANGE UP
NRBC # BLD: 0 /100 WBCS — SIGNIFICANT CHANGE UP (ref 0–0)
NRBC # FLD: 0 K/UL — SIGNIFICANT CHANGE UP (ref 0–0)
PH UR: 6 — SIGNIFICANT CHANGE UP (ref 5–8)
PLATELET # BLD AUTO: 176 K/UL — SIGNIFICANT CHANGE UP (ref 150–400)
POTASSIUM SERPL-MCNC: 4.9 MMOL/L — SIGNIFICANT CHANGE UP (ref 3.5–5.3)
POTASSIUM SERPL-SCNC: 4.9 MMOL/L — SIGNIFICANT CHANGE UP (ref 3.5–5.3)
PROT SERPL-MCNC: 7.7 G/DL — SIGNIFICANT CHANGE UP (ref 6–8.3)
PROT UR-MCNC: NEGATIVE MG/DL — SIGNIFICANT CHANGE UP
RBC # BLD: 5 M/UL — SIGNIFICANT CHANGE UP (ref 3.8–5.2)
RBC # FLD: 12.5 % — SIGNIFICANT CHANGE UP (ref 10.3–14.5)
RBC CASTS # UR COMP ASSIST: 4 /HPF — SIGNIFICANT CHANGE UP (ref 0–4)
SODIUM SERPL-SCNC: 141 MMOL/L — SIGNIFICANT CHANGE UP (ref 135–145)
SP GR SPEC: 1.02 — SIGNIFICANT CHANGE UP (ref 1–1.03)
SQUAMOUS # UR AUTO: 2 /HPF — SIGNIFICANT CHANGE UP (ref 0–5)
TRIGL SERPL-MCNC: 85 MG/DL — SIGNIFICANT CHANGE UP
UROBILINOGEN FLD QL: 1 MG/DL — SIGNIFICANT CHANGE UP (ref 0.2–1)
WBC # BLD: 8.04 K/UL — SIGNIFICANT CHANGE UP (ref 3.8–10.5)
WBC # FLD AUTO: 8.04 K/UL — SIGNIFICANT CHANGE UP (ref 3.8–10.5)
WBC UR QL: 1 /HPF — SIGNIFICANT CHANGE UP (ref 0–5)

## 2024-09-23 PROCEDURE — 99233 SBSQ HOSP IP/OBS HIGH 50: CPT

## 2024-09-23 RX ORDER — ARIPIPRAZOLE 5 MG/1
10 TABLET ORAL AT BEDTIME
Refills: 0 | Status: DISCONTINUED | OUTPATIENT
Start: 2024-09-23 | End: 2024-09-24

## 2024-09-23 RX ADMIN — ARIPIPRAZOLE 10 MILLIGRAM(S): 5 TABLET ORAL at 20:30

## 2024-09-23 NOTE — BH INPATIENT PSYCHIATRY PROGRESS NOTE - PRN MEDS
MEDICATIONS  (PRN):  acetaminophen     Tablet .. 650 milliGRAM(s) Oral every 6 hours PRN Mild Pain (1 - 3), Moderate Pain (4 - 6)  aluminum hydroxide/magnesium hydroxide/simethicone Suspension 30 milliLiter(s) Oral every 4 hours PRN Dyspepsia  diphenhydrAMINE 25 milliGRAM(s) Oral every 6 hours PRN agitation  diphenhydrAMINE Injectable 25 milliGRAM(s) IntraMuscular once PRN severe agitation  haloperidol     Tablet 5 milliGRAM(s) Oral every 6 hours PRN psychotic agitation  haloperidol    Injectable 5 milliGRAM(s) IntraMuscular once PRN severe psychotic agitation  LORazepam     Tablet 2 milliGRAM(s) Oral every 6 hours PRN agitation  LORazepam   Injectable 2 milliGRAM(s) IntraMuscular once PRN severe agitation  melatonin. 3 milliGRAM(s) Oral at bedtime PRN Insomnia  polyethylene glycol 3350 17 Gram(s) Oral daily PRN constipation   MEDICATIONS  (PRN):  acetaminophen     Tablet .. 650 milliGRAM(s) Oral every 6 hours PRN Mild Pain (1 - 3), Moderate Pain (4 - 6)  aluminum hydroxide/magnesium hydroxide/simethicone Suspension 30 milliLiter(s) Oral every 4 hours PRN Dyspepsia  haloperidol    Injectable 5 milliGRAM(s) IntraMuscular once PRN severe psychotic agitation  LORazepam     Tablet 1 milliGRAM(s) Oral every 4 hours PRN Insomnia/anxiety  LORazepam     Tablet 2 milliGRAM(s) Oral every 6 hours PRN agitation  LORazepam   Injectable 2 milliGRAM(s) IntraMuscular once PRN severe agitation  melatonin. 3 milliGRAM(s) Oral at bedtime PRN Insomnia  polyethylene glycol 3350 17 Gram(s) Oral daily PRN constipation

## 2024-09-23 NOTE — BH INPATIENT PSYCHIATRY PROGRESS NOTE - NSBHMETABOLIC_PSY_ALL_CORE_FT
BMI: BMI (kg/m2): 29.2 (09-21-24 @ 16:23)  HbA1c: A1C with Estimated Average Glucose Result: 5.3 % (09-23-24 @ 08:00)    Glucose:   BP: --Vital Signs Last 24 Hrs  T(C): 36.3 (09-23-24 @ 06:38), Max: 36.3 (09-23-24 @ 06:38)  T(F): 97.4 (09-23-24 @ 06:38), Max: 97.4 (09-23-24 @ 06:38)  HR: --  BP: --  BP(mean): --  RR: --  SpO2: --    Orthostatic VS  09-23-24 @ 06:38  Lying BP: --/-- HR: --  Sitting BP: 110/73 HR: 78  Standing BP: 103/73 HR: 88  Site: --  Mode: --  Orthostatic VS  09-22-24 @ 05:47  Lying BP: --/-- HR: --  Sitting BP: 140/90 HR: 90  Standing BP: 129/101 HR: 85  Site: upper left arm  Mode: electronic    Lipid Panel: Date/Time: 09-23-24 @ 08:00  Cholesterol, Serum: 193  LDL Cholesterol Calculated: 111  HDL Cholesterol, Serum: 65  Total Cholesterol/HDL Ration Measurement: --  Triglycerides, Serum: 85   BMI: BMI (kg/m2): 29.2 (09-21-24 @ 16:23)  HbA1c: A1C with Estimated Average Glucose Result: 5.3 % (09-23-24 @ 08:00)    Glucose:   BP: --Vital Signs Last 24 Hrs  T(C): 36.5 (09-24-24 @ 08:49), Max: 36.9 (09-23-24 @ 18:49)  T(F): 97.7 (09-24-24 @ 08:49), Max: 98.4 (09-23-24 @ 18:49)  HR: --  BP: --  BP(mean): --  RR: --  SpO2: --    Orthostatic VS  09-24-24 @ 11:23  Lying BP: --/-- HR: --  Sitting BP: 93/60 HR: 65  Standing BP: 97/66 HR: 83  Site: --  Mode: --  Orthostatic VS  09-24-24 @ 08:49  Lying BP: --/-- HR: --  Sitting BP: 110/73 HR: 82  Standing BP: 98/71 HR: 85  Site: --  Mode: --  Orthostatic VS  09-23-24 @ 18:49  Lying BP: --/-- HR: --  Sitting BP: 120/79 HR: 79  Standing BP: 119/77 HR: 80  Site: --  Mode: --  Orthostatic VS  09-23-24 @ 06:38  Lying BP: --/-- HR: --  Sitting BP: 110/73 HR: 78  Standing BP: 103/73 HR: 88  Site: --  Mode: --    Lipid Panel: Date/Time: 09-23-24 @ 08:00  Cholesterol, Serum: 193  LDL Cholesterol Calculated: 111  HDL Cholesterol, Serum: 65  Total Cholesterol/HDL Ration Measurement: --  Triglycerides, Serum: 85

## 2024-09-23 NOTE — BH INPATIENT PSYCHIATRY PROGRESS NOTE - NSBHASSESSSUMMFT_PSY_ALL_CORE
Patient is a 33 yo F, living with parents though was recently living in [place], unemployed, no known PMH, unclear PPHx (has been prescribed SSRI + aripiprazole and stimulant), last in psychiatric treatment with NP in [place], in treatment with individual therapist, no known inpatient psychiatric hospitalizations, no known suicide attempts, no known history of violence, who was BIB parents for increasing agitation, disorganization, poor sleep, pressured speech and physical aggression. She was admitted voluntarily.     This is a patient with unclear prior psychiatric history (prior individual therapy and treatment with SSRI/SGA augmentation and stimulant), who has had symptoms concerning for ashish beginning in spring 2024 and escalating significantly in the past few days prior to admission. Per patient and collateral, patient has been emotionally dysregulated, intermittently agitated, impulsive, hyperverbal, disorganized, and with intermittent poor sleep. She appeared to briefly endorse AH and appear internally pre-occupied in ED. On arrival to unit, received PRNs for disorganization and loud speech. On initial evaluation by treatment team, patient is cooperative but notably tangential, over-inclusive and difficult to follow. Slept well after getting some medications. Taken together, presentation most consistent with an improving ashish/mixed episode with psychotic features (perceptual disturbances, disorganization) and underlying bipolar I disorder. Substance-induced mood episode could be considered, though patient denies recent use and urine toxicology negative. Primary psychotic process also on differential, though less consistent with presentation/timeline.    Will increase aripiprazole and continue to monitor, consider lithium. Will order repeat UA and FEP labs. Will attempt to obtain collateral from therapist.    Plan:  1. Ibarra: admitted voluntarily 9.13  2. Observation: Routine checks  3. Collateral: obtained collateral from family, will attempt collateral from therapist  4. Psychiatric  -INCREASE to aripiprazole 10mg QHS  ---Metabolic labs reviewed  -Consider lithium  5. Medical:  -Admission labs reviewed and notable for WBC 14.80 --> 8.04, K 3.4 --> 4.9, UA with bacteria, ketones, trace LE, CTH negative, will add on repeat UA, RPR, Vitamin B12, folate, YENNY, ceruloplasmin, ESR. Admission EKG NSR, QTc 423ms.  6. PRNs  -Haldol 5mg/Ativan 2mg PO or IM for agitation  7. Therapy   -Individual, group and milieu therapy as appropriate   8. Disposition: pending stabilization, will need med provider      Plan:  #General  - Legals: admit to 3 on 9.13 voluntary status.  - routine observation  - group, individual, and milieu therapy as appropriate    #Psych  -start Abilify 5mg hs,  -monitor CIWA because patient is too disorganized to quantify how much she is drinking    #PRNs  - haldol 5 mg / ativan 2 mg q6h PO/IM PRN for agitation/anxiety    #Medical  - no acute or chronic medical issues  - admission labs wnl, wbc 14, primary team can repeat, ct head wnl, ekg: SR qtc 423ms    #Disposition  - pending clinical improvement; requires inpatient hospitalization for stabilization and safety Patient is a 35 yo F, living with parents though was recently living in [place], unemployed, no known PMH, unclear PPHx (has been prescribed SSRI + aripiprazole and stimulant), last in psychiatric treatment with NP in [place], in treatment with individual therapist, no known inpatient psychiatric hospitalizations, no known suicide attempts, no known history of violence, who was BIB parents for increasing agitation, disorganization, poor sleep, pressured speech and physical aggression. She was admitted voluntarily.     This is a patient with unclear prior psychiatric history (prior individual therapy and treatment with SSRI/SGA augmentation and stimulant), who has had symptoms concerning for ashish beginning in spring 2024 and escalating significantly in the past few days prior to admission. Per patient and collateral, patient has been emotionally dysregulated, intermittently agitated, impulsive, hyperverbal, disorganized, and with intermittent poor sleep. She appeared to briefly endorse AH and appear internally pre-occupied in ED. On arrival to unit, received PRNs for disorganization and loud speech. On initial evaluation by treatment team, patient is cooperative but notably tangential, over-inclusive and difficult to follow. Slept well after getting some medications. Taken together, presentation most consistent with an improving ashish/mixed episode with psychotic features (perceptual disturbances, disorganization) and underlying bipolar I disorder. Substance-induced mood episode could be considered, though patient denies recent use and urine toxicology negative. Primary psychotic process also on differential, though less consistent with presentation/timeline.    Will increase aripiprazole and continue to monitor, consider lithium. Will order repeat UA and FEP labs. Will attempt to obtain collateral from therapist.    Plan:  1. Ibarra: admitted voluntarily 9.13  2. Observation: Routine checks  3. Collateral: obtained collateral from family, will attempt collateral from therapist  4. Psychiatric  -INCREASE to aripiprazole 10mg QHS  ---Metabolic labs reviewed  -Consider lithium  5. Medical:  -Admission labs reviewed and notable for WBC 14.80 --> 8.04, K 3.4 --> 4.9, UA with bacteria, ketones, trace LE, CTH negative, will add on repeat UA, RPR, Vitamin B12, folate, EYNNY, ceruloplasmin, ESR. Admission EKG NSR, QTc 423ms.  6. PRNs  -Haldol 5mg/Ativan 2mg PO or IM for agitation  7. Therapy   -Individual, group and milieu therapy as appropriate   8. Disposition: pending stabilization, will need med provider

## 2024-09-23 NOTE — BH SOCIAL WORK INITIAL PSYCHOSOCIAL EVALUATION - OTHER PAST PSYCHIATRIC HISTORY (INCLUDE DETAILS REGARDING ONSET, COURSE OF ILLNESS, INPATIENT/OUTPATIENT TREATMENT)
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

## 2024-09-23 NOTE — BH INPATIENT PSYCHIATRY PROGRESS NOTE - NSBHCHARTREVIEWVS_PSY_A_CORE FT
Vital Signs Last 24 Hrs  T(C): 36.3 (09-23-24 @ 06:38), Max: 36.3 (09-23-24 @ 06:38)  T(F): 97.4 (09-23-24 @ 06:38), Max: 97.4 (09-23-24 @ 06:38)  HR: --  BP: --  BP(mean): --  RR: --  SpO2: --    Orthostatic VS  09-23-24 @ 06:38  Lying BP: --/-- HR: --  Sitting BP: 110/73 HR: 78  Standing BP: 103/73 HR: 88  Site: --  Mode: --  Orthostatic VS  09-22-24 @ 05:47  Lying BP: --/-- HR: --  Sitting BP: 140/90 HR: 90  Standing BP: 129/101 HR: 85  Site: upper left arm  Mode: electronic   Vital Signs Last 24 Hrs  T(C): 36.5 (09-24-24 @ 08:49), Max: 36.9 (09-23-24 @ 18:49)  T(F): 97.7 (09-24-24 @ 08:49), Max: 98.4 (09-23-24 @ 18:49)  HR: --  BP: --  BP(mean): --  RR: --  SpO2: --    Orthostatic VS  09-24-24 @ 11:23  Lying BP: --/-- HR: --  Sitting BP: 93/60 HR: 65  Standing BP: 97/66 HR: 83  Site: --  Mode: --  Orthostatic VS  09-24-24 @ 08:49  Lying BP: --/-- HR: --  Sitting BP: 110/73 HR: 82  Standing BP: 98/71 HR: 85  Site: --  Mode: --  Orthostatic VS  09-23-24 @ 18:49  Lying BP: --/-- HR: --  Sitting BP: 120/79 HR: 79  Standing BP: 119/77 HR: 80  Site: --  Mode: --  Orthostatic VS  09-23-24 @ 06:38  Lying BP: --/-- HR: --  Sitting BP: 110/73 HR: 78  Standing BP: 103/73 HR: 88  Site: --  Mode: --

## 2024-09-23 NOTE — BH INPATIENT PSYCHIATRY PROGRESS NOTE - MSE UNSTRUCTURED FT
Appearance: hair unkempt but grooming overall fair  Behavior: cooperative, over-inclusive, no PMA/PMR   Speech: hyperverbal, near pressured  Mood: anxious  Affect: anxious, mildly irritable  Thought process: circumstantial to tangential, flight of ideas  Thought content: no delusions elicited  Perceptions: no AH, VH elicited, does not appear RIS  Insight: partial  Judgement: partial  Cognition: grossly intact   Gait: intact

## 2024-09-23 NOTE — BH INPATIENT PSYCHIATRY PROGRESS NOTE - CURRENT MEDICATION
MEDICATIONS  (STANDING):  ARIPiprazole 10 milliGRAM(s) Oral at bedtime    MEDICATIONS  (PRN):  acetaminophen     Tablet .. 650 milliGRAM(s) Oral every 6 hours PRN Mild Pain (1 - 3), Moderate Pain (4 - 6)  aluminum hydroxide/magnesium hydroxide/simethicone Suspension 30 milliLiter(s) Oral every 4 hours PRN Dyspepsia  diphenhydrAMINE 25 milliGRAM(s) Oral every 6 hours PRN agitation  diphenhydrAMINE Injectable 25 milliGRAM(s) IntraMuscular once PRN severe agitation  haloperidol     Tablet 5 milliGRAM(s) Oral every 6 hours PRN psychotic agitation  haloperidol    Injectable 5 milliGRAM(s) IntraMuscular once PRN severe psychotic agitation  LORazepam     Tablet 2 milliGRAM(s) Oral every 6 hours PRN agitation  LORazepam   Injectable 2 milliGRAM(s) IntraMuscular once PRN severe agitation  melatonin. 3 milliGRAM(s) Oral at bedtime PRN Insomnia  polyethylene glycol 3350 17 Gram(s) Oral daily PRN constipation   MEDICATIONS  (STANDING):  ARIPiprazole 15 milliGRAM(s) Oral at bedtime  lithium 600 milliGRAM(s) Oral at bedtime    MEDICATIONS  (PRN):  acetaminophen     Tablet .. 650 milliGRAM(s) Oral every 6 hours PRN Mild Pain (1 - 3), Moderate Pain (4 - 6)  aluminum hydroxide/magnesium hydroxide/simethicone Suspension 30 milliLiter(s) Oral every 4 hours PRN Dyspepsia  haloperidol    Injectable 5 milliGRAM(s) IntraMuscular once PRN severe psychotic agitation  LORazepam     Tablet 1 milliGRAM(s) Oral every 4 hours PRN Insomnia/anxiety  LORazepam     Tablet 2 milliGRAM(s) Oral every 6 hours PRN agitation  LORazepam   Injectable 2 milliGRAM(s) IntraMuscular once PRN severe agitation  melatonin. 3 milliGRAM(s) Oral at bedtime PRN Insomnia  polyethylene glycol 3350 17 Gram(s) Oral daily PRN constipation

## 2024-09-24 LAB
CERULOPLASMIN SERPL-MCNC: 25 MG/DL — SIGNIFICANT CHANGE UP (ref 16–45)
ERYTHROCYTE [SEDIMENTATION RATE] IN BLOOD: 5 MM/HR — SIGNIFICANT CHANGE UP (ref 4–25)
FOLATE SERPL-MCNC: 9.3 NG/ML — SIGNIFICANT CHANGE UP (ref 3.1–17.5)
VIT B12 SERPL-MCNC: 685 PG/ML — SIGNIFICANT CHANGE UP (ref 200–900)

## 2024-09-24 PROCEDURE — 99233 SBSQ HOSP IP/OBS HIGH 50: CPT

## 2024-09-24 RX ORDER — LITHIUM CARBONATE 300 MG
600 TABLET, EXTENDED RELEASE ORAL AT BEDTIME
Refills: 0 | Status: DISCONTINUED | OUTPATIENT
Start: 2024-09-24 | End: 2024-09-25

## 2024-09-24 RX ORDER — ARIPIPRAZOLE 5 MG/1
15 TABLET ORAL AT BEDTIME
Refills: 0 | Status: DISCONTINUED | OUTPATIENT
Start: 2024-09-24 | End: 2024-09-26

## 2024-09-24 RX ADMIN — Medication 5 MILLIGRAM(S): at 07:08

## 2024-09-24 RX ADMIN — Medication 3 MILLIGRAM(S): at 23:07

## 2024-09-24 RX ADMIN — Medication 2 MILLIGRAM(S): at 07:08

## 2024-09-24 RX ADMIN — Medication 2 MILLIGRAM(S): at 23:07

## 2024-09-24 RX ADMIN — Medication 600 MILLIGRAM(S): at 20:36

## 2024-09-24 RX ADMIN — ARIPIPRAZOLE 15 MILLIGRAM(S): 5 TABLET ORAL at 20:36

## 2024-09-24 NOTE — BH INPATIENT PSYCHIATRY PROGRESS NOTE - NSBHASSESSSUMMFT_PSY_ALL_CORE
Patient is a 33 yo F, living with parents though was recently living in [place], unemployed, no known PMH, unclear PPHx (has been prescribed SSRI + aripiprazole and stimulant), last in psychiatric treatment with NP in [place], in treatment with individual therapist, no known inpatient psychiatric hospitalizations, no known suicide attempts, no known history of violence, who was BIB parents for increasing agitation, disorganization, poor sleep, pressured speech and physical aggression. She was admitted voluntarily.     This is a patient with unclear prior psychiatric history (prior individual therapy and treatment with SSRI/SGA augmentation and stimulant), who has had symptoms concerning for ashish beginning in spring 2024 and escalating significantly in the past few days prior to admission. Per patient and collateral, patient has been emotionally dysregulated, intermittently agitated, impulsive, hyperverbal, disorganized, and with intermittent poor sleep. She appeared to briefly endorse AH and appear internally pre-occupied in ED. On arrival to unit, received PRNs for disorganization and loud speech. On initial evaluation by treatment team, patient is cooperative but notably tangential, over-inclusive and difficult to follow. Slept well after getting some medications. Taken together, presentation most consistent with an improving ashish/mixed episode with psychotic features (perceptual disturbances, disorganization) and underlying bipolar I disorder. Substance-induced mood episode could be considered, though patient denies recent use and urine toxicology negative. Primary psychotic process also on differential, though less consistent with presentation/timeline.    Will increase aripiprazole and continue to monitor, consider lithium. Will order repeat UA and FEP labs. Will attempt to obtain collateral from therapist.    Plan:  1. Ibarra: admitted voluntarily 9.13  2. Observation: Routine checks  3. Collateral: obtained collateral from family, will attempt collateral from therapist  4. Psychiatric  -INCREASE to aripiprazole 10mg QHS  ---Metabolic labs reviewed  -Consider lithium  5. Medical:  -Admission labs reviewed and notable for WBC 14.80 --> 8.04, K 3.4 --> 4.9, UA with bacteria, ketones, trace LE, CTH negative, will add on repeat UA, RPR, Vitamin B12, folate, YENNY, ceruloplasmin, ESR. Admission EKG NSR, QTc 423ms.  6. PRNs  -Haldol 5mg/Ativan 2mg PO or IM for agitation  7. Therapy   -Individual, group and milieu therapy as appropriate   8. Disposition: pending stabilization, will need med provider      Plan:  #General  - Legals: admit to 3 on 9.13 voluntary status.  - routine observation  - group, individual, and milieu therapy as appropriate    #Psych  -start Abilify 5mg hs,  -monitor CIWA because patient is too disorganized to quantify how much she is drinking    #PRNs  - haldol 5 mg / ativan 2 mg q6h PO/IM PRN for agitation/anxiety    #Medical  - no acute or chronic medical issues  - admission labs wnl, wbc 14, primary team can repeat, ct head wnl, ekg: SR qtc 423ms    #Disposition  - pending clinical improvement; requires inpatient hospitalization for stabilization and safety Patient is a 35 yo F, living with parents though was recently living in [place], unemployed, no known PMH, unclear PPHx (has been prescribed SSRI + aripiprazole and stimulant), last in psychiatric treatment with NP in [place], in treatment with individual therapist, no known inpatient psychiatric hospitalizations, no known suicide attempts, no known history of violence, who was BIB parents for increasing agitation, disorganization, poor sleep, pressured speech and physical aggression. She was admitted voluntarily.     This is a patient with unclear prior psychiatric diagnoses (prior individual therapy and treatment with SSRI/SGA augmentation and stimulant), who has had symptoms concerning for ashish beginning in spring 2024 and escalating significantly in the past few days prior to admission. Per patient and collateral, patient has been emotionally dysregulated, intermittently agitated, impulsive, hyperverbal, disorganized, and with intermittent poor sleep. She briefly endorsed AH to family and appear internally pre-occupied in ED. On arrival to unit, received PRNs for disorganization and loud speech. On initial evaluation by treatment team, patient was cooperative and pleasant but notably tangential, over-inclusive and difficult to follow. Taken together, presentation most consistent with an improving ashish/mixed episode with psychotic features (perceptual disturbances, disorganization) and underlying bipolar I disorder. Substance-induced mood episode could be considered, though patient denies recent use and urine toxicology negative. Primary psychotic process also on differential, though less consistent with presentation/timeline.    Today, patient with confusion and verbal agitation this morning, and threw trash can. Subjectively described feeling overwhelmed and confused. Thought process remains somewhat disorganized, circumstantial and difficult to follow. Will increase aripiprazole. Will initiate lithium (risks/benefits reviewed with patient). Lorazepam 1mg PRN available for insomnia (appeared very sedated on haloperidol 5mg/lorazepam 2mg PO, so will try to minimize).    Plan:  1. Ibarra: admitted voluntarily 9.13  2. Observation: Routine checks  3. Collateral: obtained collateral from family, left  for therapist   4. Psychiatric  -INCREASE to aripiprazole 15mg QHS  -START lithium 600mg QHS  	-Baseline Cr 0.78 (9/23), TSH 1.42 (9/21), EKG NSR, Upreg negative  ---Metabolic labs reviewed  5. Medical:  -Admission labs reviewed and notable for WBC 14.80 --> 8.04, K 3.4 --> 4.9, UA with bacteria, ketones, trace LE, CTH negative, will add on repeat UA (1 WBC, occasional bacteria)  -Add FEP labs: RPR, Vitamin B12 (wnl), folate (wnl), YENNY, ceruloplasmin, ESR. Admission EKG NSR, QTc 423ms.  6. PRNs  -Ativan 1mg PRN for insomnia/anxiety  -Ativan 2mg PO or IM for agitation  -Haldol 5mg/Ativan 2mg IM for severe agitation  7. Therapy   -Individual, group and milieu therapy as appropriate   8. Disposition: pending stabilization, will need med provider

## 2024-09-24 NOTE — BH INPATIENT PSYCHIATRY PROGRESS NOTE - PRN MEDS
MEDICATIONS  (PRN):  acetaminophen     Tablet .. 650 milliGRAM(s) Oral every 6 hours PRN Mild Pain (1 - 3), Moderate Pain (4 - 6)  aluminum hydroxide/magnesium hydroxide/simethicone Suspension 30 milliLiter(s) Oral every 4 hours PRN Dyspepsia  haloperidol    Injectable 5 milliGRAM(s) IntraMuscular once PRN severe psychotic agitation  LORazepam     Tablet 1 milliGRAM(s) Oral every 4 hours PRN Insomnia/anxiety  LORazepam     Tablet 2 milliGRAM(s) Oral every 6 hours PRN agitation  LORazepam   Injectable 2 milliGRAM(s) IntraMuscular once PRN severe agitation  melatonin. 3 milliGRAM(s) Oral at bedtime PRN Insomnia  polyethylene glycol 3350 17 Gram(s) Oral daily PRN constipation

## 2024-09-24 NOTE — BH INPATIENT PSYCHIATRY PROGRESS NOTE - CURRENT MEDICATION
MEDICATIONS  (STANDING):  ARIPiprazole 15 milliGRAM(s) Oral at bedtime  lithium 600 milliGRAM(s) Oral at bedtime    MEDICATIONS  (PRN):  acetaminophen     Tablet .. 650 milliGRAM(s) Oral every 6 hours PRN Mild Pain (1 - 3), Moderate Pain (4 - 6)  aluminum hydroxide/magnesium hydroxide/simethicone Suspension 30 milliLiter(s) Oral every 4 hours PRN Dyspepsia  haloperidol    Injectable 5 milliGRAM(s) IntraMuscular once PRN severe psychotic agitation  LORazepam     Tablet 1 milliGRAM(s) Oral every 4 hours PRN Insomnia/anxiety  LORazepam     Tablet 2 milliGRAM(s) Oral every 6 hours PRN agitation  LORazepam   Injectable 2 milliGRAM(s) IntraMuscular once PRN severe agitation  melatonin. 3 milliGRAM(s) Oral at bedtime PRN Insomnia  polyethylene glycol 3350 17 Gram(s) Oral daily PRN constipation

## 2024-09-24 NOTE — BH INPATIENT PSYCHIATRY PROGRESS NOTE - NSBHFUPINTERVALHXFT_PSY_A_CORE
VSS, accepting medications, received Haldol 5mg/Ativan 2mg. Sl PO this morning for screaming and throwing a garbagcan [facility], accepting medications, received Haldol 5mg/Ativan 2mg PO this morning for screaming and throwing a garbage can. Slept on and off overnight, but did not sleep well per pt.    Patient seen late morning sleeping in bed. Described feeling groggy and unable to participate in interview. Seen mid afternoon again. Still groggy from PRNs, but able to engage in conversation. States she feels "overwhelmed" and "confused" and then relayed an anecdote about a friend who she felt lied to their mutual practitioner about her. She states that this "unwound" her past, though hard to follow her narrative on this subject. Endorses racing thoughts, distractibility. Reports she slept very little last night. Denies muscle stiffness, restlessness, tremor. Attempted to orient patient to situation. Reviewed medication plan, including increasing Abilify, starting lithium. Risks/benefits of lithium reviewed, including kidney and thyroid damage, and need for regular blood monitoring. Patient expressed understanding and willingness to take.    Spoke to patient's mother, provided updates. Mother notes patient seemed confused and disorganized when she saw her yesterday.

## 2024-09-24 NOTE — BH INPATIENT PSYCHIATRY PROGRESS NOTE - NSBHMETABOLIC_PSY_ALL_CORE_FT
BMI: BMI (kg/m2): 29.2 (09-21-24 @ 16:23)  HbA1c: A1C with Estimated Average Glucose Result: 5.3 % (09-23-24 @ 08:00)    Glucose:   BP: --Vital Signs Last 24 Hrs  T(C): 36.5 (09-24-24 @ 08:49), Max: 36.9 (09-23-24 @ 18:49)  T(F): 97.7 (09-24-24 @ 08:49), Max: 98.4 (09-23-24 @ 18:49)  HR: --  BP: --  BP(mean): --  RR: --  SpO2: --    Orthostatic VS  09-24-24 @ 11:23  Lying BP: --/-- HR: --  Sitting BP: 93/60 HR: 65  Standing BP: 97/66 HR: 83  Site: --  Mode: --  Orthostatic VS  09-24-24 @ 08:49  Lying BP: --/-- HR: --  Sitting BP: 110/73 HR: 82  Standing BP: 98/71 HR: 85  Site: --  Mode: --  Orthostatic VS  09-23-24 @ 18:49  Lying BP: --/-- HR: --  Sitting BP: 120/79 HR: 79  Standing BP: 119/77 HR: 80  Site: --  Mode: --  Orthostatic VS  09-23-24 @ 06:38  Lying BP: --/-- HR: --  Sitting BP: 110/73 HR: 78  Standing BP: 103/73 HR: 88  Site: --  Mode: --    Lipid Panel: Date/Time: 09-23-24 @ 08:00  Cholesterol, Serum: 193  LDL Cholesterol Calculated: 111  HDL Cholesterol, Serum: 65  Total Cholesterol/HDL Ration Measurement: --  Triglycerides, Serum: 85

## 2024-09-24 NOTE — BH INPATIENT PSYCHIATRY PROGRESS NOTE - NSBHCHARTREVIEWVS_PSY_A_CORE FT
Vital Signs Last 24 Hrs  T(C): 36.5 (09-24-24 @ 08:49), Max: 36.9 (09-23-24 @ 18:49)  T(F): 97.7 (09-24-24 @ 08:49), Max: 98.4 (09-23-24 @ 18:49)  HR: --  BP: --  BP(mean): --  RR: --  SpO2: --    Orthostatic VS  09-24-24 @ 11:23  Lying BP: --/-- HR: --  Sitting BP: 93/60 HR: 65  Standing BP: 97/66 HR: 83  Site: --  Mode: --  Orthostatic VS  09-24-24 @ 08:49  Lying BP: --/-- HR: --  Sitting BP: 110/73 HR: 82  Standing BP: 98/71 HR: 85  Site: --  Mode: --  Orthostatic VS  09-23-24 @ 18:49  Lying BP: --/-- HR: --  Sitting BP: 120/79 HR: 79  Standing BP: 119/77 HR: 80  Site: --  Mode: --  Orthostatic VS  09-23-24 @ 06:38  Lying BP: --/-- HR: --  Sitting BP: 110/73 HR: 78  Standing BP: 103/73 HR: 88  Site: --  Mode: --

## 2024-09-24 NOTE — BH INPATIENT PSYCHIATRY PROGRESS NOTE - MSE UNSTRUCTURED FT
Appearance: hair unkempt but grooming overall fair  Behavior: cooperative, over-inclusive, no PMA/PMR   Speech: hyperverbal, near pressured  Mood: anxious  Affect: anxious, mildly irritable  Thought process: circumstantial to tangential, flight of ideas  Thought content: no delusions elicited  Perceptions: no AH, VH elicited, does not appear RIS  Insight: partial  Judgement: partial  Cognition: grossly intact   Gait: intact Appearance: hair unkempt but grooming overall fair  Behavior: cooperative, over-inclusive at times, no PMA/PMR   Speech: hyperverbal but less rapid today  Mood: distressed  Affect: anxious  Thought process: circumstantial, disorganized, difficult to follow at times  Thought content: no delusions elicited, some negative themes (ie. being disgusted by herself)  Perceptions: no AH, VH elicited, does not appear RIS  Insight: partial  Judgement: partial  Gait: intact

## 2024-09-25 LAB
ANA TITR SER: NEGATIVE — SIGNIFICANT CHANGE UP
CULTURE RESULTS: SIGNIFICANT CHANGE UP
SPECIMEN SOURCE: SIGNIFICANT CHANGE UP
T PALLIDUM AB TITR SER: NEGATIVE — SIGNIFICANT CHANGE UP

## 2024-09-25 PROCEDURE — 99232 SBSQ HOSP IP/OBS MODERATE 35: CPT

## 2024-09-25 RX ORDER — LITHIUM CARBONATE 300 MG
900 TABLET, EXTENDED RELEASE ORAL AT BEDTIME
Refills: 0 | Status: DISCONTINUED | OUTPATIENT
Start: 2024-09-25 | End: 2024-09-30

## 2024-09-25 RX ADMIN — Medication 2 MILLIGRAM(S): at 14:04

## 2024-09-25 RX ADMIN — Medication 900 MILLIGRAM(S): at 20:05

## 2024-09-25 RX ADMIN — ARIPIPRAZOLE 15 MILLIGRAM(S): 5 TABLET ORAL at 20:05

## 2024-09-25 NOTE — BH INPATIENT PSYCHIATRY PROGRESS NOTE - NSBHCHARTREVIEWVS_PSY_A_CORE FT
Vital Signs Last 24 Hrs  T(C): 36.3 (09-25-24 @ 08:22), Max: 36.6 (09-24-24 @ 19:44)  T(F): 97.4 (09-25-24 @ 08:22), Max: 97.9 (09-24-24 @ 19:44)  HR: --  BP: --  BP(mean): --  RR: 18 (09-25-24 @ 08:22) (18 - 18)  SpO2: --    Orthostatic VS  09-25-24 @ 08:22  Lying BP: --/-- HR: --  Sitting BP: 94/62 HR: 79  Standing BP: 93/61 HR: 94  Site: --  Mode: --  Orthostatic VS  09-24-24 @ 19:44  Lying BP: --/-- HR: --  Sitting BP: 116/61 HR: 97  Standing BP: 109/64 HR: 111  Site: --  Mode: --  Orthostatic VS  09-24-24 @ 11:23  Lying BP: --/-- HR: --  Sitting BP: 93/60 HR: 65  Standing BP: 97/66 HR: 83  Site: --  Mode: --  Orthostatic VS  09-24-24 @ 08:49  Lying BP: --/-- HR: --  Sitting BP: 110/73 HR: 82  Standing BP: 98/71 HR: 85  Site: --  Mode: --  Orthostatic VS  09-23-24 @ 18:49  Lying BP: --/-- HR: --  Sitting BP: 120/79 HR: 79  Standing BP: 119/77 HR: 80  Site: --  Mode: --

## 2024-09-25 NOTE — BH INPATIENT PSYCHIATRY PROGRESS NOTE - MSE UNSTRUCTURED FT
Appearance: hair unkempt but grooming overall fair  Behavior: distressed, confused, head in hand, poor eye contact   Speech: hyperverbal but less rapid today  Mood: distressed  Affect: irritable  Thought process: vague, disorganized, difficult to follow at times  Thought content: no delusions elicited, though frequent negative themes  Perceptions: no AH, VH elicited, does not appear internally pre-occupied  Insight: partial  Judgement: partial  Gait: intact

## 2024-09-25 NOTE — BH INPATIENT PSYCHIATRY PROGRESS NOTE - CURRENT MEDICATION
MEDICATIONS  (STANDING):  ARIPiprazole 15 milliGRAM(s) Oral at bedtime  lithium 900 milliGRAM(s) Oral at bedtime    MEDICATIONS  (PRN):  acetaminophen     Tablet .. 650 milliGRAM(s) Oral every 6 hours PRN Mild Pain (1 - 3), Moderate Pain (4 - 6)  aluminum hydroxide/magnesium hydroxide/simethicone Suspension 30 milliLiter(s) Oral every 4 hours PRN Dyspepsia  haloperidol    Injectable 5 milliGRAM(s) IntraMuscular once PRN severe psychotic agitation  LORazepam     Tablet 1 milliGRAM(s) Oral every 4 hours PRN Insomnia/anxiety  LORazepam     Tablet 2 milliGRAM(s) Oral every 6 hours PRN agitation  LORazepam   Injectable 2 milliGRAM(s) IntraMuscular once PRN severe agitation  melatonin. 3 milliGRAM(s) Oral at bedtime PRN Insomnia  polyethylene glycol 3350 17 Gram(s) Oral daily PRN constipation  propranolol 10 milliGRAM(s) Oral three times a day PRN Restlessness

## 2024-09-25 NOTE — BH INPATIENT PSYCHIATRY PROGRESS NOTE - NSBHASSESSSUMMFT_PSY_ALL_CORE
Patient is a 33 yo F, living with parents though was recently living in [place], unemployed, no known PMH, unclear PPHx (has been prescribed SSRI + aripiprazole and stimulant), last in psychiatric treatment with NP in [place], in treatment with individual therapist, no known inpatient psychiatric hospitalizations, no known suicide attempts, no known history of violence, who was BIB parents for increasing agitation, disorganization, poor sleep, pressured speech and physical aggression. She was admitted voluntarily.     This is a patient with unclear prior psychiatric diagnoses (prior individual therapy and treatment with SSRI/SGA augmentation and stimulant), who has had symptoms concerning for ashish beginning in spring 2024 and escalating significantly in the past few days prior to admission. Per patient and collateral, patient has been emotionally dysregulated, intermittently agitated, impulsive, hyperverbal, disorganized, and with intermittent poor sleep. She briefly endorsed AH to family and appeared internally pre-occupied in ED. On arrival to unit, received PRNs for disorganization and loud speech. On initial evaluation by treatment team, patient was notably tangential, over-inclusive and difficult to follow. Taken together, presentation most consistent with an improving ashish/mixed episode with psychotic features (perceptual disturbances, disorganization) and underlying bipolar I disorder. Substance-induced mood episode could be considered, though patient denies recent use and urine toxicology negative and symptoms have persisted on admission. Primary psychotic process also on differential, though less consistent with presentation/timeline.    Today, patient appears more distressed and irritable, with ongoing vague/disorganized thought process and subjective report of only partial sleep overnight. Possible akathisia (though appears more related to anxiety secondary to hospitalization/ashish), propranolol PRN added, and will defer increasing aripiprazole today. Will increase lithium to 900mg tonight, level next week.    Plan:  1. Ibarra: admitted voluntarily 9.13  2. Observation: Routine checks  3. Collateral: obtained collateral from family, left VM for therapist   4. Psychiatric  -Continue aripiprazole 15mg QHS  -INCREASE to lithium 900mg QHS  	-Baseline Cr 0.78 (9/23), TSH 1.42 (9/21), EKG NSR, Upreg negative  	-Level 9/30  ---Metabolic labs reviewed  5. Medical:  -Admission labs reviewed and notable for WBC 14.80 --> 8.04, K 3.4 --> 4.9, UA with bacteria, ketones, trace LE, CTH negative, will add on repeat UA (1 WBC, occasional bacteria)  -Add FEP labs: RPR, Vitamin B12 (wnl), folate (wnl), YENNY, ceruloplasmin, ESR. Admission EKG NSR, QTc 423ms.  6. PRNs  -Ativan 1mg PRN for insomnia/anxiety  -Ativan 2mg PO or IM for agitation  -Haldol 5mg/Ativan 2mg IM for severe agitation  7. Therapy   -Individual, group and milieu therapy as appropriate   8. Disposition: pending stabilization, will need med provider Patient is a 33 yo F, living with parents though was recently living in [place], unemployed, no known PMH, unclear PPHx (has been prescribed SSRI + aripiprazole and stimulant), last in psychiatric treatment with NP in [place], in treatment with individual therapist, no known inpatient psychiatric hospitalizations, no known suicide attempts, no known history of violence, who was BIB parents for increasing agitation, disorganization, poor sleep, pressured speech and physical aggression. She was admitted voluntarily.     This is a patient with unclear prior psychiatric diagnoses (prior individual therapy and treatment with SSRI/SGA augmentation and stimulant), who has had symptoms concerning for ashish beginning in spring 2024 and escalating significantly in the past few days prior to admission. Per patient and collateral, patient has been emotionally dysregulated, intermittently agitated, impulsive, hyperverbal, disorganized, and with intermittent poor sleep. She briefly endorsed AH to family and appeared internally pre-occupied in ED. On arrival to unit, received PRNs for disorganization and loud speech. On initial evaluation by treatment team, patient was notably tangential, over-inclusive and difficult to follow. Taken together, presentation most consistent with an improving ashish/mixed episode with psychotic features (perceptual disturbances, disorganization) and underlying bipolar I disorder. Substance-induced mood episode could be considered, though patient denies recent use and urine toxicology negative and symptoms have persisted on admission. Primary psychotic process also on differential, though less consistent with presentation/timeline.    Today, patient appears more distressed and irritable, with ongoing vague/disorganized thought process and subjective report of only partial sleep overnight. Possible akathisia (though appears more related to anxiety secondary to hospitalization/ashish), propranolol PRN added, and will defer increasing aripiprazole today. Will increase lithium to 900mg tonight, level next week.    Plan:  1. Ibarra: admitted voluntarily 9.13  2. Observation: Routine checks  3. Collateral: obtained collateral from family, left VM for therapist   4. Psychiatric  -Continue aripiprazole 15mg QHS  -INCREASE to lithium 900mg QHS  	-Baseline Cr 0.78 (9/23), TSH 1.42 (9/21), EKG NSR, Upreg negative  	-Level 9/30  ---Metabolic labs reviewed  5. Medical:  -Admission labs reviewed and notable for WBC 14.80 --> 8.04, K 3.4 --> 4.9, UA with bacteria, ketones, trace LE, CTH negative, will add on repeat UA (1 WBC, occasional bacteria)  -Add FEP labs: RPR (neg), Vitamin B12 (wnl), folate (wnl), YENNY, ceruloplasmin (wnl), ESR (wnl). Admission EKG NSR, QTc 423ms.  6. PRNs  -Ativan 1mg PRN for insomnia/anxiety  -Ativan 2mg PO or IM for agitation  -Haldol 5mg/Ativan 2mg IM for severe agitation  7. Therapy   -Individual, group and milieu therapy as appropriate   8. Disposition: pending stabilization, will need med provider Patient is a 35 yo F, living with parents though was recently living in [place], unemployed, no known PMH, unclear PPHx (has been prescribed SSRI + aripiprazole and stimulant), last in psychiatric treatment with NP in [place], in treatment with individual therapist, no known inpatient psychiatric hospitalizations, no known suicide attempts, no known history of violence, who was BIB parents for increasing agitation, disorganization, poor sleep, pressured speech and physical aggression. She was admitted voluntarily.     This is a patient with unclear prior psychiatric diagnoses (prior individual therapy and treatment with SSRI/SGA augmentation and stimulant), who has had symptoms concerning for ashish beginning in spring 2024 and escalating significantly in the past few days prior to admission. Per patient and collateral, patient has been emotionally dysregulated, intermittently agitated, impulsive, hyperverbal, disorganized, and with intermittent poor sleep. She briefly endorsed AH to family and appeared internally pre-occupied in ED. On arrival to unit, received PRNs for disorganization and loud speech. On initial evaluation by treatment team, patient was notably tangential, over-inclusive and difficult to follow. Taken together, presentation most consistent with an improving ashish/mixed episode with psychotic features (perceptual disturbances, disorganization) and underlying bipolar I disorder. Substance-induced mood episode could be considered, though patient denies recent use and urine toxicology negative and symptoms have persisted on admission. Primary psychotic process also on differential, though less consistent with presentation/timeline.    Today, patient appears more distressed and irritable, with ongoing vague/disorganized thought process and subjective report of only partial sleep overnight. Possible akathisia (though appears more related to anxiety secondary to hospitalization/ashish), propranolol PRN added, and will defer increasing aripiprazole today. Will increase lithium to 900mg tonight, level next week, though submitted 3DL today    Plan:  1. Ibarra: admitted voluntarily 9.13; submitted 3DL on 9/25  2. Observation: Routine checks  3. Collateral: obtained collateral from family and therapist  4. Psychiatric  -Continue aripiprazole 15mg QHS  -INCREASE to lithium 900mg QHS  	-Baseline Cr 0.78 (9/23), TSH 1.42 (9/21), EKG NSR, Upreg negative  	-Level 9/30  ---Metabolic labs reviewed  5. Medical:  -Admission labs reviewed and notable for WBC 14.80 --> 8.04, K 3.4 --> 4.9, UA with bacteria, ketones, trace LE, CTH negative, will add on repeat UA (1 WBC, occasional bacteria)  -Add FEP labs: RPR (neg), Vitamin B12 (wnl), folate (wnl), YENNY, ceruloplasmin (wnl), ESR (wnl). Admission EKG NSR, QTc 423ms.  6. PRNs  -Ativan 1mg PRN for insomnia/anxiety  -Ativan 2mg PO or IM for agitation  -Haldol 5mg/Ativan 2mg IM for severe agitation  7. Therapy   -Individual, group and milieu therapy as appropriate   8. Disposition: pending stabilization, will need med provider

## 2024-09-25 NOTE — BH INPATIENT PSYCHIATRY PROGRESS NOTE - PRN MEDS
MEDICATIONS  (PRN):  acetaminophen     Tablet .. 650 milliGRAM(s) Oral every 6 hours PRN Mild Pain (1 - 3), Moderate Pain (4 - 6)  aluminum hydroxide/magnesium hydroxide/simethicone Suspension 30 milliLiter(s) Oral every 4 hours PRN Dyspepsia  haloperidol    Injectable 5 milliGRAM(s) IntraMuscular once PRN severe psychotic agitation  LORazepam     Tablet 1 milliGRAM(s) Oral every 4 hours PRN Insomnia/anxiety  LORazepam     Tablet 2 milliGRAM(s) Oral every 6 hours PRN agitation  LORazepam   Injectable 2 milliGRAM(s) IntraMuscular once PRN severe agitation  melatonin. 3 milliGRAM(s) Oral at bedtime PRN Insomnia  polyethylene glycol 3350 17 Gram(s) Oral daily PRN constipation  propranolol 10 milliGRAM(s) Oral three times a day PRN Restlessness

## 2024-09-25 NOTE — BH INPATIENT PSYCHIATRY PROGRESS NOTE - NSBHFUPINTERVALHXFT_PSY_A_CORE
VSS, accepting medications, no PRNs for agitation though received Ativan 2mg and melatonin 3mg overnight.     Patient seen in interview room. Appears very distressed, head in hand, with irritable edge. She is often repeating that she doesn't belong here, or doesn't know what to believe, or that people don't believe her, but struggles to elaborate on what any of this means. She reports only sleeping on and off last night, continues to feel overwhelmed. Still describes feeling confused, and talks about this "undoing everything." She reports sense of restlessness but unable to really describe whether this is physical or mental; explain that this could potentially be a side effect of medication and can try as needed medication available to see if it helps. Patient states she doesn't know where she wants to go from here, but doesn't want to go home to parents. Asks to end interview.

## 2024-09-26 PROCEDURE — 99232 SBSQ HOSP IP/OBS MODERATE 35: CPT

## 2024-09-26 RX ORDER — ARIPIPRAZOLE 5 MG/1
20 TABLET ORAL AT BEDTIME
Refills: 0 | Status: DISCONTINUED | OUTPATIENT
Start: 2024-09-26 | End: 2024-10-04

## 2024-09-26 RX ADMIN — Medication 900 MILLIGRAM(S): at 20:20

## 2024-09-26 RX ADMIN — Medication 1 MILLIGRAM(S): at 20:20

## 2024-09-26 RX ADMIN — Medication 1 MILLIGRAM(S): at 21:53

## 2024-09-26 RX ADMIN — ARIPIPRAZOLE 20 MILLIGRAM(S): 5 TABLET ORAL at 20:21
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
## 2024-09-26 NOTE — BH INPATIENT PSYCHIATRY PROGRESS NOTE - NSBHMETABOLIC_PSY_ALL_CORE_FT
BMI: BMI (kg/m2): 29.2 (09-21-24 @ 16:23)  HbA1c: A1C with Estimated Average Glucose Result: 5.3 % (09-23-24 @ 08:00)    Glucose:   BP: --Vital Signs Last 24 Hrs  T(C): 36.6 (09-26-24 @ 08:34), Max: 36.6 (09-26-24 @ 08:34)  T(F): 97.8 (09-26-24 @ 08:34), Max: 97.8 (09-26-24 @ 08:34)  HR: --  BP: --  BP(mean): --  RR: --  SpO2: --    Orthostatic VS  09-26-24 @ 08:34  Lying BP: --/-- HR: --  Sitting BP: 101/63 HR: 69  Standing BP: 86/62 HR: 83  Site: --  Mode: --  Orthostatic VS  09-25-24 @ 08:22  Lying BP: --/-- HR: --  Sitting BP: 94/62 HR: 79  Standing BP: 93/61 HR: 94  Site: --  Mode: --  Orthostatic VS  09-24-24 @ 19:44  Lying BP: --/-- HR: --  Sitting BP: 116/61 HR: 97  Standing BP: 109/64 HR: 111  Site: --  Mode: --    Lipid Panel: Date/Time: 09-23-24 @ 08:00  Cholesterol, Serum: 193  LDL Cholesterol Calculated: 111  HDL Cholesterol, Serum: 65  Total Cholesterol/HDL Ration Measurement: --  Triglycerides, Serum: 85

## 2024-09-26 NOTE — BH INPATIENT PSYCHIATRY PROGRESS NOTE - CURRENT MEDICATION
MEDICATIONS  (STANDING):  ARIPiprazole 20 milliGRAM(s) Oral at bedtime  lithium 900 milliGRAM(s) Oral at bedtime  LORazepam     Tablet 1 milliGRAM(s) Oral at bedtime    MEDICATIONS  (PRN):  acetaminophen     Tablet .. 650 milliGRAM(s) Oral every 6 hours PRN Mild Pain (1 - 3), Moderate Pain (4 - 6)  aluminum hydroxide/magnesium hydroxide/simethicone Suspension 30 milliLiter(s) Oral every 4 hours PRN Dyspepsia  haloperidol    Injectable 5 milliGRAM(s) IntraMuscular once PRN severe psychotic agitation  LORazepam     Tablet 1 milliGRAM(s) Oral every 4 hours PRN Insomnia/anxiety  LORazepam     Tablet 2 milliGRAM(s) Oral every 6 hours PRN agitation  LORazepam   Injectable 2 milliGRAM(s) IntraMuscular once PRN severe agitation  melatonin. 3 milliGRAM(s) Oral at bedtime PRN Insomnia  polyethylene glycol 3350 17 Gram(s) Oral daily PRN constipation  propranolol 10 milliGRAM(s) Oral three times a day PRN Restlessness

## 2024-09-26 NOTE — BH INPATIENT PSYCHIATRY PROGRESS NOTE - NSBHCHARTREVIEWVS_PSY_A_CORE FT
Vital Signs Last 24 Hrs  T(C): 36.6 (09-26-24 @ 08:34), Max: 36.6 (09-26-24 @ 08:34)  T(F): 97.8 (09-26-24 @ 08:34), Max: 97.8 (09-26-24 @ 08:34)  HR: --  BP: --  BP(mean): --  RR: --  SpO2: --    Orthostatic VS  09-26-24 @ 08:34  Lying BP: --/-- HR: --  Sitting BP: 101/63 HR: 69  Standing BP: 86/62 HR: 83  Site: --  Mode: --  Orthostatic VS  09-25-24 @ 08:22  Lying BP: --/-- HR: --  Sitting BP: 94/62 HR: 79  Standing BP: 93/61 HR: 94  Site: --  Mode: --  Orthostatic VS  09-24-24 @ 19:44  Lying BP: --/-- HR: --  Sitting BP: 116/61 HR: 97  Standing BP: 109/64 HR: 111  Site: --  Mode: --

## 2024-09-26 NOTE — BH INPATIENT PSYCHIATRY PROGRESS NOTE - NSBHASSESSSUMMFT_PSY_ALL_CORE
Patient is a 33 yo F, living with parents though was recently living in [place], unemployed, no known PMH, unclear PPHx (has been prescribed SSRI + aripiprazole and stimulant), last in psychiatric treatment with NP in [place], in treatment with individual therapist, no known inpatient psychiatric hospitalizations, no known suicide attempts, no known history of violence, who was BIB parents for increasing agitation, disorganization, poor sleep, pressured speech and physical aggression. She was admitted voluntarily.     This is a patient with unclear prior psychiatric diagnoses (prior individual therapy and treatment with SSRI/SGA augmentation and stimulant), who has had symptoms concerning for ashish beginning in spring 2024 and escalating significantly in the past few days prior to admission. Per patient and collateral, patient has been emotionally dysregulated, intermittently agitated, impulsive, hyperverbal, disorganized, and with intermittent poor sleep. She briefly endorsed AH to family and appeared internally pre-occupied in ED. On arrival to unit, received PRNs for disorganization and loud speech. On initial evaluation by treatment team, patient was notably tangential, over-inclusive and difficult to follow. Taken together, presentation most consistent with an improving ashish/mixed episode with psychotic features (perceptual disturbances, disorganization) and underlying bipolar I disorder. Substance-induced mood episode could be considered, though patient denies recent use and urine toxicology negative and symptoms have persisted on admission. Primary psychotic process also on differential, though less consistent with presentation/timeline.    Patient remains irritable, subjectively confused, sleeping poorly per her report, disorganized with loose associations. Expresses dissatisfaction with hospitalization, emotionally noting she wants to exercise, but continues to show poor judgement (has thrown things in past 2 days), and unable to engage in reasonable planning regarding things such as where she'll live after discharge (states she doesn't want to live with parents, can't live with sister). Will increase aripiprazole, add lorazepam for sleep and continue lithium (level on 9/30).    Plan:  1. Ibarra: admitted voluntarily 9.13; submitted 3DL on 9/25, retracted, then resubmitted on 9/26  2. Observation: Routine checks  3. Collateral: obtained collateral from family and therapist  4. Psychiatric  -INCREASE to aripiprazole 20mg QHS  -Continue lithium 900mg QHS  	-Baseline Cr 0.78 (9/23), TSH 1.42 (9/21), EKG NSR, Upreg negative  	-Level 9/30  -START lorazepam 1mg QHS for insomnia  ---Metabolic labs reviewed  5. Medical:  -Admission labs reviewed and notable for WBC 14.80 --> 8.04, K 3.4 --> 4.9, UA with bacteria, ketones, trace LE, CTH negative, will add on repeat UA (1 WBC, occasional bacteria)  -Add FEP labs: RPR (neg), Vitamin B12 (wnl), folate (wnl), YENNY, ceruloplasmin (wnl), ESR (wnl). Admission EKG NSR, QTc 423ms.  6. PRNs  -Ativan 1mg PRN for insomnia/anxiety  -Ativan 2mg PO or IM for agitation  -Haldol 5mg/Ativan 2mg IM for severe agitation  7. Therapy   -Individual, group and milieu therapy as appropriate   8. Disposition: pending stabilization, will need med provider Patient is a 33 yo F, living with parents though was recently living in [place], unemployed, no known PMH, unclear PPHx (has been prescribed SSRI + aripiprazole and stimulant), last in psychiatric treatment with NP in [place], in treatment with individual therapist, no known inpatient psychiatric hospitalizations, no known suicide attempts, no known history of violence, who was BIB parents for increasing agitation, disorganization, poor sleep, pressured speech and physical aggression. She was admitted voluntarily.     This is a patient with unclear prior psychiatric diagnoses (prior individual therapy and treatment with SSRI/SGA augmentation and stimulant), who has had symptoms concerning for ashish beginning in spring 2024 and escalating significantly in the past few days prior to admission. Per patient and collateral, patient has been emotionally dysregulated, intermittently agitated, impulsive, hyperverbal, disorganized, and with intermittent poor sleep. She briefly endorsed AH to family and appeared internally pre-occupied in ED. On arrival to unit, received PRNs for disorganization and loud speech. On initial evaluation by treatment team, patient was notably tangential, over-inclusive and difficult to follow. Taken together, presentation most consistent with an improving ashish/mixed episode with psychotic features (perceptual disturbances, disorganization) and underlying bipolar I disorder. Substance-induced mood episode could be considered, though patient denies recent use and urine toxicology negative and symptoms have persisted on admission. Primary psychotic process also on differential, though less consistent with presentation/timeline.    Patient remains irritable, subjectively confused, sleeping poorly per her report, disorganized with loose associations. Expresses dissatisfaction with hospitalization, emotionally noting she wants to exercise, but continues to show poor judgement (has thrown things in past 2 days), and unable to engage in reasonable planning regarding things such as where she'll live after discharge (states she doesn't want to live with parents, can't live with sister). Will increase aripiprazole, add lorazepam for sleep and continue lithium (level on 9/30).    Plan:  1. Ibarra: admitted voluntarily 9.13; submitted 3DL on 9/25, retracted, then resubmitted on 9/26  2. Observation: Routine checks  3. Collateral: obtained collateral from family and therapist  4. Psychiatric  -INCREASE to aripiprazole 20mg QHS  -Continue lithium 900mg QHS  	-Baseline Cr 0.78 (9/23), TSH 1.42 (9/21), EKG NSR, Upreg negative  	-Level 9/30  -START lorazepam 1mg QHS for insomnia  ---Metabolic labs reviewed  5. Medical:  -Admission labs reviewed and notable for WBC 14.80 --> 8.04, K 3.4 --> 4.9, UA with bacteria, ketones, trace LE, CTH negative, will add on repeat UA (1 WBC, occasional bacteria)  -Add FEP labs: RPR (neg), Vitamin B12 (wnl), folate (wnl), YENNY (neg), ceruloplasmin (wnl), ESR (wnl). Admission EKG NSR, QTc 423ms.  6. PRNs  -Ativan 1mg PRN for insomnia/anxiety  -Ativan 2mg PO or IM for agitation  -Haldol 5mg/Ativan 2mg IM for severe agitation  7. Therapy   -Individual, group and milieu therapy as appropriate   8. Disposition: pending stabilization, will need med provider

## 2024-09-26 NOTE — BH INPATIENT PSYCHIATRY PROGRESS NOTE - MSE UNSTRUCTURED FT
Appearance: hair unkempt but grooming overall fair  Behavior: less distressed appearing today, improved eye contact  Speech: hyperverbal but less rapid than on admission  Mood: "irritable"  Affect: irritable  Thought process: vague, disorganized, difficult to follow at times  Thought content: no delusions elicited, though frequent negative themes  Perceptions: no AH, VH elicited, does not appear internally pre-occupied  Insight: partial  Judgement: partial  Gait: intact

## 2024-09-27 PROCEDURE — 99232 SBSQ HOSP IP/OBS MODERATE 35: CPT

## 2024-09-27 RX ADMIN — Medication 1 MILLIGRAM(S): at 16:21

## 2024-09-27 RX ADMIN — ARIPIPRAZOLE 20 MILLIGRAM(S): 5 TABLET ORAL at 20:21

## 2024-09-27 RX ADMIN — Medication 2 MILLIGRAM(S): at 20:21

## 2024-09-27 RX ADMIN — Medication 900 MILLIGRAM(S): at 20:21

## 2024-09-27 NOTE — BH INPATIENT PSYCHIATRY PROGRESS NOTE - NSBHASSESSSUMMFT_PSY_ALL_CORE
Patient is a 35 yo F, living with parents though was recently living in [place], unemployed, no known PMH, unclear PPHx (has been prescribed SSRI + aripiprazole and stimulant), last in psychiatric treatment with NP in [place], in treatment with individual therapist, no known inpatient psychiatric hospitalizations, no known suicide attempts, no known history of violence, who was BIB parents for increasing agitation, disorganization, poor sleep, pressured speech and physical aggression. She was admitted voluntarily.     This is a patient with unclear prior psychiatric diagnoses (prior treatment with SSRI/SGA augmentation and stimulant), who has had symptoms concerning for ashish beginning in spring 2024 and escalating significantly in the past few days prior to admission. Per patient and collateral, patient has been emotionally dysregulated, intermittently agitated, impulsive, hyperverbal, disorganized, and with intermittent poor sleep. She briefly endorsed AH to family and appeared internally pre-occupied in ED. On arrival to unit, received PRNs for disorganization and loud speech. On initial evaluation by treatment team, patient was notably tangential, over-inclusive and difficult to follow. Taken together, presentation most consistent with ashish/mixed episode with psychotic features (perceptual disturbances, disorganization) and underlying bipolar I disorder. Substance-induced mood episode unlikely, as patient denies recent use and urine toxicology negative and symptoms have persisted for many days since admission. Primary psychotic process also on differential, though less consistent with presentation/timeline. Per therapist, possible underlying BPD traits.    Patient remains irritable, disorganized with loose associations, not sleeping (per her report) and with poor insight and judgement around concerns and need for further hospitalization. Signed 3DL, though willing to retract today. Will increase nightly lorazepam, continue aripiprazole and lithium.    Plan:  1. [place]bar[place]: admitted voluntarily 9.13; submitted 3DL on 9/25, retracted, then resubmitted on 9/26  2. Observation: Routine checks  3. Collateral: obtained collateral from family and therapist  4. Psychiatric  -Continue aripiprazole 20mg QHS  -Continue lithium 900mg QHS  	-Baseline Cr 0.78 (9/23), TSH 1.42 (9/21), EKG NSR, Upreg negative  	-Level 9/30  -INCREASE to lorazepam 2mg QHS for insomnia  ---Metabolic labs reviewed  5. Medical:  -Admission labs reviewed and notable for WBC 14.80 --> 8.04, K 3.4 --> 4.9, UA with bacteria, ketones, trace LE, CTH negative, will add on repeat UA (1 WBC, occasional bacteria)  -Add FEP labs: RPR (neg), Vitamin B12 (wnl), folate (wnl), YENNY (neg), ceruloplasmin (wnl), ESR (wnl). Admission EKG NSR, QTc 423ms.  6. PRNs  -Ativan 1mg PRN for insomnia/anxiety  -Ativan 2mg PO or IM for agitation  -Haldol 5mg/Ativan 2mg IM for severe agitation  7. Therapy   -Individual, group and milieu therapy as appropriate   8. Disposition: pending stabilization, will need med provider Patient is a 35 yo F, living with parents though was recently living in [place], unemployed, no known PMH, unclear PPHx (has been prescribed SSRI + aripiprazole and stimulant), last in psychiatric treatment with NP in [place], in treatment with individual therapist, no known inpatient psychiatric hospitalizations, no known suicide attempts, no known history of violence, who was BIB parents for increasing agitation, disorganization, poor sleep, pressured speech and physical aggression. She was admitted voluntarily.     This is a patient with unclear prior psychiatric diagnoses (prior treatment with SSRI/SGA augmentation and stimulant), who has had symptoms concerning for ashish beginning in spring 2024 and escalating significantly in the past few days prior to admission. Per patient and collateral, patient has been emotionally dysregulated, intermittently agitated, impulsive, hyperverbal, disorganized, and with intermittent poor sleep. She briefly endorsed AH to family and appeared internally pre-occupied in ED. On arrival to unit, received PRNs for disorganization and loud speech. On initial evaluation by treatment team, patient was notably tangential, over-inclusive and difficult to follow. Taken together, presentation most consistent with ashish/mixed episode with psychotic features (perceptual disturbances, disorganization) and underlying bipolar I disorder. Substance-induced mood episode unlikely, as patient denies recent use and urine toxicology negative and symptoms have persisted for many days since admission. Primary psychotic process also on differential, though less consistent with presentation/timeline. Per therapist, possible underlying BPD traits.    Patient remains irritable, disorganized with loose associations, not sleeping (per her report) and with poor insight and judgement around concerns and need for further hospitalization. Showing impulsive/disorganized behaviors (taking off pants and dumping salad last night). Signed 3DL, though willing to retract today. Will increase nightly lorazepam, continue aripiprazole and lithium.    Plan:  1. Ibarra: admitted voluntarily 9.13; submitted 3DL on 9/25, retracted, then resubmitted on 9/26  2. Observation: Routine checks  3. Collateral: obtained collateral from family and therapist  4. Psychiatric  -Continue aripiprazole 20mg QHS  -Continue lithium 900mg QHS  	-Baseline Cr 0.78 (9/23), TSH 1.42 (9/21), EKG NSR, Upreg negative  	-Level 9/30  -INCREASE to lorazepam 2mg QHS for insomnia  ---Metabolic labs reviewed  5. Medical:  -Admission labs reviewed and notable for WBC 14.80 --> 8.04, K 3.4 --> 4.9, UA with bacteria, ketones, trace LE, CTH negative, will add on repeat UA (1 WBC, occasional bacteria)  -Add FEP labs: RPR (neg), Vitamin B12 (wnl), folate (wnl), YENNY (neg), ceruloplasmin (wnl), ESR (wnl). Admission EKG NSR, QTc 423ms.  6. PRNs  -Ativan 1mg PRN for insomnia/anxiety  -Ativan 2mg PO or IM for agitation  -Haldol 5mg/Ativan 2mg IM for severe agitation  7. Therapy   -Individual, group and milieu therapy as appropriate   8. Disposition: pending stabilization, will need med provider

## 2024-09-27 NOTE — BH INPATIENT PSYCHIATRY PROGRESS NOTE - MSE UNSTRUCTURED FT
Appearance: hair unkempt, slightly disheveled   Behavior: cooperative but activated and distressed appearing  Speech: hyperverbal but less rapid than on admission  Mood: "a little irritable"  Affect: irritable, some lability  Thought process: jumps between various ideas/anecdotes with unclear connection, difficult to follow  Thought content: no len delusions elicited  Perceptions: no AH, VH elicited, does not appear internally pre-occupied  Insight: partial  Judgement: partial  Gait: intact
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
## 2024-09-27 NOTE — BH INPATIENT PSYCHIATRY PROGRESS NOTE - NSICDXBHTERTIARYDX_PSY_ALL_CORE
R/O Bipolar 1 disorder   F31.9  R/O Major depression with psychotic features   F32.3  R/O Schizoaffective disorder   F25.9  R/O Psychoactive substance-induced mood disorder   F19.94  

## 2024-09-27 NOTE — BH INPATIENT PSYCHIATRY PROGRESS NOTE - NSBHMETABOLIC_PSY_ALL_CORE_FT
BMI: BMI (kg/m2): 29.2 (09-21-24 @ 16:23)  HbA1c: A1C with Estimated Average Glucose Result: 5.3 % (09-23-24 @ 08:00)    Glucose:   BP: --Vital Signs Last 24 Hrs  T(C): 36.4 (09-27-24 @ 08:36), Max: 36.4 (09-27-24 @ 08:36)  T(F): 97.5 (09-27-24 @ 08:36), Max: 97.5 (09-27-24 @ 08:36)  HR: --  BP: --  BP(mean): --  RR: --  SpO2: --    Orthostatic VS  09-27-24 @ 08:36  Lying BP: --/-- HR: --  Sitting BP: 96/60 HR: 60  Standing BP: 98/62 HR: 59  Site: --  Mode: --  Orthostatic VS  09-26-24 @ 08:34  Lying BP: --/-- HR: --  Sitting BP: 101/63 HR: 69  Standing BP: 86/62 HR: 83  Site: --  Mode: --    Lipid Panel: Date/Time: 09-23-24 @ 08:00  Cholesterol, Serum: 193  LDL Cholesterol Calculated: 111  HDL Cholesterol, Serum: 65  Total Cholesterol/HDL Ration Measurement: --  Triglycerides, Serum: 85

## 2024-09-27 NOTE — BH INPATIENT PSYCHIATRY PROGRESS NOTE - CURRENT MEDICATION
MEDICATIONS  (STANDING):  ARIPiprazole 20 milliGRAM(s) Oral at bedtime  lithium 900 milliGRAM(s) Oral at bedtime  LORazepam     Tablet 1 milliGRAM(s) Oral at bedtime    MEDICATIONS  (PRN):  acetaminophen     Tablet .. 650 milliGRAM(s) Oral every 6 hours PRN Mild Pain (1 - 3), Moderate Pain (4 - 6)  aluminum hydroxide/magnesium hydroxide/simethicone Suspension 30 milliLiter(s) Oral every 4 hours PRN Dyspepsia  haloperidol    Injectable 5 milliGRAM(s) IntraMuscular once PRN severe psychotic agitation  LORazepam     Tablet 1 milliGRAM(s) Oral every 4 hours PRN Insomnia/anxiety  LORazepam     Tablet 2 milliGRAM(s) Oral every 6 hours PRN agitation  LORazepam   Injectable 2 milliGRAM(s) IntraMuscular once PRN severe agitation  melatonin. 3 milliGRAM(s) Oral at bedtime PRN Insomnia  polyethylene glycol 3350 17 Gram(s) Oral daily PRN constipation  propranolol 10 milliGRAM(s) Oral three times a day PRN Restlessness   MEDICATIONS  (STANDING):  ARIPiprazole 20 milliGRAM(s) Oral at bedtime  lithium 900 milliGRAM(s) Oral at bedtime  LORazepam     Tablet 2 milliGRAM(s) Oral at bedtime    MEDICATIONS  (PRN):  acetaminophen     Tablet .. 650 milliGRAM(s) Oral every 6 hours PRN Mild Pain (1 - 3), Moderate Pain (4 - 6)  aluminum hydroxide/magnesium hydroxide/simethicone Suspension 30 milliLiter(s) Oral every 4 hours PRN Dyspepsia  haloperidol    Injectable 5 milliGRAM(s) IntraMuscular once PRN severe psychotic agitation  LORazepam     Tablet 1 milliGRAM(s) Oral every 4 hours PRN Insomnia/anxiety  LORazepam     Tablet 2 milliGRAM(s) Oral every 6 hours PRN agitation  LORazepam   Injectable 2 milliGRAM(s) IntraMuscular once PRN severe agitation  melatonin. 3 milliGRAM(s) Oral at bedtime PRN Insomnia  polyethylene glycol 3350 17 Gram(s) Oral daily PRN constipation  propranolol 10 milliGRAM(s) Oral three times a day PRN Restlessness

## 2024-09-28 RX ADMIN — Medication 900 MILLIGRAM(S): at 20:00

## 2024-09-28 RX ADMIN — ARIPIPRAZOLE 20 MILLIGRAM(S): 5 TABLET ORAL at 20:01

## 2024-09-28 RX ADMIN — Medication 2 MILLIGRAM(S): at 20:01

## 2024-09-29 RX ADMIN — Medication 2 MILLIGRAM(S): at 20:07

## 2024-09-29 RX ADMIN — ARIPIPRAZOLE 20 MILLIGRAM(S): 5 TABLET ORAL at 20:07

## 2024-09-29 RX ADMIN — Medication 900 MILLIGRAM(S): at 20:07

## 2024-09-30 LAB — LITHIUM SERPL-MCNC: 0.7 MMOL/L — SIGNIFICANT CHANGE UP (ref 0.6–1.2)

## 2024-09-30 PROCEDURE — 99232 SBSQ HOSP IP/OBS MODERATE 35: CPT

## 2024-09-30 RX ORDER — LITHIUM CARBONATE 300 MG
1050 TABLET, EXTENDED RELEASE ORAL AT BEDTIME
Refills: 0 | Status: DISCONTINUED | OUTPATIENT
Start: 2024-09-30 | End: 2024-10-04

## 2024-09-30 RX ADMIN — Medication 1 MILLIGRAM(S): at 20:07

## 2024-09-30 RX ADMIN — ARIPIPRAZOLE 20 MILLIGRAM(S): 5 TABLET ORAL at 20:07

## 2024-09-30 RX ADMIN — Medication 1050 MILLIGRAM(S): at 20:08

## 2024-09-30 NOTE — BH INPATIENT PSYCHIATRY PROGRESS NOTE - NSBHCHARTREVIEWVS_PSY_A_CORE FT
Vital Signs Last 24 Hrs  T(C): 37 (09-30-24 @ 08:41), Max: 37 (09-30-24 @ 08:41)  T(F): 98.6 (09-30-24 @ 08:41), Max: 98.6 (09-30-24 @ 08:41)  HR: --  BP: --  BP(mean): --  RR: --  SpO2: --    Orthostatic VS  09-30-24 @ 08:41  Lying BP: --/-- HR: --  Sitting BP: 97/65 HR: 64  Standing BP: 90/63 HR: 80  Site: --  Mode: --  Orthostatic VS  09-29-24 @ 07:57  Lying BP: --/-- HR: --  Sitting BP: 101/66 HR: 74  Standing BP: 95/69 HR: 80  Site: --  Mode: --

## 2024-09-30 NOTE — BH INPATIENT PSYCHIATRY PROGRESS NOTE - CURRENT MEDICATION
MEDICATIONS  (STANDING):  ARIPiprazole 20 milliGRAM(s) Oral at bedtime  lithium 1050 milliGRAM(s) Oral at bedtime  LORazepam     Tablet 2 milliGRAM(s) Oral at bedtime    MEDICATIONS  (PRN):  acetaminophen     Tablet .. 650 milliGRAM(s) Oral every 6 hours PRN Mild Pain (1 - 3), Moderate Pain (4 - 6)  aluminum hydroxide/magnesium hydroxide/simethicone Suspension 30 milliLiter(s) Oral every 4 hours PRN Dyspepsia  haloperidol    Injectable 5 milliGRAM(s) IntraMuscular once PRN severe psychotic agitation  LORazepam     Tablet 1 milliGRAM(s) Oral every 4 hours PRN Insomnia/anxiety  LORazepam     Tablet 2 milliGRAM(s) Oral every 6 hours PRN agitation  LORazepam   Injectable 2 milliGRAM(s) IntraMuscular once PRN severe agitation  melatonin. 3 milliGRAM(s) Oral at bedtime PRN Insomnia  polyethylene glycol 3350 17 Gram(s) Oral daily PRN constipation  propranolol 10 milliGRAM(s) Oral three times a day PRN Restlessness

## 2024-09-30 NOTE — BH INPATIENT PSYCHIATRY PROGRESS NOTE - MSE UNSTRUCTURED FT
Appearance: fair grooming/hygiene  Behavior: initially calm and cooperative, but irritable and stand-offish at end of interview  Speech: less hyperverbal than admission, normal rate  Mood: "calm"  Affect: initially somewhat calm, then irritable  Thought process: circumstantial or jumping between various ideas/anecdotes with unclear connection, though improved and with some linear periods  Thought content: paranoid content around previous experiences at [facility] and connection to current hospitalization, though no len delusions elicited  Perceptions: no AH, VH elicited, does not appear internally pre-occupied  Insight: partial  Judgement: partial  Gait: intact

## 2024-09-30 NOTE — BH INPATIENT PSYCHIATRY PROGRESS NOTE - NSBHMETABOLIC_PSY_ALL_CORE_FT
BMI: BMI (kg/m2): 29.2 (09-21-24 @ 16:23)  HbA1c: A1C with Estimated Average Glucose Result: 5.3 % (09-23-24 @ 08:00)    Glucose:   BP: --Vital Signs Last 24 Hrs  T(C): 37 (09-30-24 @ 08:41), Max: 37 (09-30-24 @ 08:41)  T(F): 98.6 (09-30-24 @ 08:41), Max: 98.6 (09-30-24 @ 08:41)  HR: --  BP: --  BP(mean): --  RR: --  SpO2: --    Orthostatic VS  09-30-24 @ 08:41  Lying BP: --/-- HR: --  Sitting BP: 97/65 HR: 64  Standing BP: 90/63 HR: 80  Site: --  Mode: --  Orthostatic VS  09-29-24 @ 07:57  Lying BP: --/-- HR: --  Sitting BP: 101/66 HR: 74  Standing BP: 95/69 HR: 80  Site: --  Mode: --    Lipid Panel: Date/Time: 09-23-24 @ 08:00  Cholesterol, Serum: 193  LDL Cholesterol Calculated: 111  HDL Cholesterol, Serum: 65  Total Cholesterol/HDL Ration Measurement: --  Triglycerides, Serum: 85
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
## 2024-09-30 NOTE — BH INPATIENT PSYCHIATRY PROGRESS NOTE - NSBHASSESSSUMMFT_PSY_ALL_CORE
Patient is a 33 yo F, living with parents though was recently living in [place], unemployed, no known PMH, unclear PPHx (has been prescribed SSRI + aripiprazole and stimulant), last in psychiatric treatment with NP in [place], in treatment with individual therapist, no known inpatient psychiatric hospitalizations, no known suicide attempts, no known history of violence, who was BIB parents for increasing agitation, disorganization, poor sleep, pressured speech and physical aggression. She was admitted voluntarily.     This is a patient with unclear prior psychiatric diagnoses (prior treatment with SSRI/SGA augmentation and stimulant), who has had symptoms concerning for ashish beginning in spring 2024 and escalating significantly in the past few days prior to admission. Per patient and collateral, patient has been emotionally dysregulated, intermittently agitated, impulsive, hyperverbal, disorganized, and with intermittent poor sleep. She briefly endorsed AH to family and appeared internally pre-occupied in ED. On arrival to unit, received PRNs for disorganization and loud speech. On initial evaluation by treatment team, patient was notably tangential, over-inclusive and difficult to follow. Taken together, presentation most consistent with ashish/mixed episode with psychotic features (perceptual disturbances, disorganization) and underlying bipolar I disorder. Substance-induced mood episode unlikely, as patient denies recent use and urine toxicology negative and symptoms have persisted for many days since admission. Primary psychotic process also on differential, though less consistent with presentation/timeline. Per therapist, possible underlying BPD traits.    Today, patient showing some improvements. Affect is more calm, though becomes increasingly irritable as interview progresses and informed of not being discharged today. Less loose and disorganized, but does still jump between topics and can be circumstantial at times. Some vague paranoia present regarding her hospitalization and prior experiences at [facility]. [place] level low therapeutic for ashish (0.7), so will increase slightly. Will reduce lorazepam at night as sleeping better.    Plan:  1. Ibarra: admitted voluntarily 9.13; submitted 3DL on 9/25, retracted, then resubmitted on 9/26 and retracted 9/26  2. Observation: Routine checks  3. Collateral: obtained collateral from family and therapist  4. Psychiatric  -Continue aripiprazole 20mg QHS  -INCREASE to lithium 1050mg QHS  	-Baseline Cr 0.78 (9/23), TSH 1.42 (9/21), EKG NSR, Upreg negative  	-Level 9/30 of 0.7 (at 900mg)  -DECREASE to lorazepam 1mg QHS for insomnia  ---Metabolic labs reviewed  5. Medical:  -Admission labs reviewed and notable for WBC 14.80 --> 8.04, K 3.4 --> 4.9, UA with bacteria, ketones, trace LE, CTH negative, will add on repeat UA (1 WBC, occasional bacteria)  -Add FEP labs: RPR (neg), Vitamin B12 (wnl), folate (wnl), YENNY (neg), ceruloplasmin (wnl), ESR (wnl). Admission EKG NSR, QTc 423ms.  6. PRNs  -Ativan 1mg PRN for insomnia/anxiety  -Ativan 2mg PO or IM for agitation  -Haldol 5mg/Ativan 2mg IM for severe agitation  7. Therapy   -Individual, group and milieu therapy as appropriate   8. Disposition: pending stabilization, will need med provider

## 2024-10-01 RX ADMIN — ARIPIPRAZOLE 20 MILLIGRAM(S): 5 TABLET ORAL at 20:21

## 2024-10-01 RX ADMIN — Medication 1050 MILLIGRAM(S): at 20:22

## 2024-10-01 NOTE — BH INPATIENT PSYCHIATRY PROGRESS NOTE - NSBHFUPINTERVALHXFT_PSY_A_CORE
VSS, accepting medications, no PRNs for agitation, no acute events overnight. Slept well overnight per log.    Patient states her mood is "pretty good" and that she slept well. Note she asked her mother not to visit yesterday. Denies feeling confused today. Revisit discussion from yesterday regarding connection between [employer] work experiences and her hospitalization. Now clarifies and describes recently having to get letters of recommendation from former colleagues, and that this was uncomfortable. States that this came to mind as she's at a [facility] facility, but that they are not connected. Denies AH, VH, SI, HI. Reports some neck strain but otherwise no other physical symptoms. Review plan to d/c standing Ativan but note it's available as PRN.

## 2024-10-01 NOTE — BH INPATIENT PSYCHIATRY PROGRESS NOTE - NSBHASSESSSUMMFT_PSY_ALL_CORE
Patient is a 33 yo F, living with parents though was recently living in [place], unemployed, no known PMH, unclear PPHx (has been prescribed SSRI + aripiprazole and stimulant), last in psychiatric treatment with NP in [place], in treatment with individual therapist, no known inpatient psychiatric hospitalizations, no known suicide attempts, no known history of violence, who was BIB parents for increasing agitation, disorganization, poor sleep, pressured speech and physical aggression. She was admitted voluntarily.     This is a patient with unclear prior psychiatric diagnoses (prior treatment with SSRI/SGA augmentation and stimulant), who has had symptoms concerning for ashish beginning in spring 2024 and escalating significantly in the past few days prior to admission. Per patient and collateral, patient has been emotionally dysregulated, intermittently agitated, impulsive, hyperverbal, disorganized, and with intermittent poor sleep. She briefly endorsed AH to family and appeared internally pre-occupied in ED. On arrival to unit, received PRNs for disorganization and loud speech. On initial evaluation by treatment team, patient was notably tangential, over-inclusive and difficult to follow. Taken together, presentation most consistent with ashish/mixed episode with psychotic features (perceptual disturbances, disorganization) and underlying bipolar I disorder. Substance-induced mood episode unlikely, as patient denies recent use and urine toxicology negative and symptoms have persisted for many days since admission. Primary psychotic process also on differential, though less consistent with presentation/timeline. Per therapist, possible underlying BPD traits.    Patient continues to show interval improvements, today appearing much more euthymic and less irritable, fairly normal speech and more organized/on topic. Not clear the extent of her insight; agreeable to taking medications and follow-up, but also eager to leave and doesn't seem to necessarily feel she had a mood episode. Sleeping well, will d/c standing lorazepam, but PRN available. Plan for family meeting tomorrow to discuss plans for after discharge. Referral to bipolar clinic made. [place] labs on Friday.    Plan:  1. Ibarra: admitted voluntarily 9.13; submitted 3DL on 9/25, retracted, then resubmitted on 9/26 and retracted 9/26  2. Observation: Routine checks  3. Collateral: obtained collateral from family and therapist  4. Psychiatric  -Continue aripiprazole 20mg QHS  -Continue lithium 1050mg QHS  	-Baseline Cr 0.78 (9/23), TSH 1.42 (9/21), EKG NSR, Upreg negative  	-Level 9/30 of 0.7 (at 900mg)  	-Lithium level, BMP and TSH on 10/4  -d/c lorazepam 1mg QHS --- though PRN available  ---Metabolic labs reviewed  5. Medical:  -Admission labs reviewed and notable for WBC 14.80 --> 8.04, K 3.4 --> 4.9, UA with bacteria, ketones, trace LE, CTH negative, will add on repeat UA (1 WBC, occasional bacteria)  -Add FEP labs: RPR (neg), Vitamin B12 (wnl), folate (wnl), YENNY (neg), ceruloplasmin (wnl), ESR (wnl). Admission EKG NSR, QTc 423ms.  6. PRNs  -Ativan 1mg PRN for insomnia/anxiety  -Ativan 2mg PO or IM for agitation  -Haldol 5mg/Ativan 2mg IM for severe agitation  7. Therapy   -Individual, group and milieu therapy as appropriate   8. Disposition: referral placed to [facility] bipolar clinic

## 2024-10-01 NOTE — BH INPATIENT PSYCHIATRY PROGRESS NOTE - NSBHCHARTREVIEWVS_PSY_A_CORE FT
Vital Signs Last 24 Hrs  T(C): 37.1 (10-01-24 @ 08:41), Max: 37.1 (10-01-24 @ 08:41)  T(F): 98.7 (10-01-24 @ 08:41), Max: 98.7 (10-01-24 @ 08:41)  HR: --  BP: --  BP(mean): --  RR: --  SpO2: --    Orthostatic VS  10-01-24 @ 08:41  Lying BP: --/-- HR: --  Sitting BP: 98/60 HR: 58  Standing BP: 100/66 HR: 62  Site: --  Mode: --  Orthostatic VS  09-30-24 @ 08:41  Lying BP: --/-- HR: --  Sitting BP: 97/65 HR: 64  Standing BP: 90/63 HR: 80  Site: --  Mode: --

## 2024-10-01 NOTE — BH INPATIENT PSYCHIATRY PROGRESS NOTE - NSBHMETABOLIC_PSY_ALL_CORE_FT
BMI: BMI (kg/m2): 29.2 (09-21-24 @ 16:23)  HbA1c: A1C with Estimated Average Glucose Result: 5.3 % (09-23-24 @ 08:00)    Glucose:   BP: --Vital Signs Last 24 Hrs  T(C): 37.1 (10-01-24 @ 08:41), Max: 37.1 (10-01-24 @ 08:41)  T(F): 98.7 (10-01-24 @ 08:41), Max: 98.7 (10-01-24 @ 08:41)  HR: --  BP: --  BP(mean): --  RR: --  SpO2: --    Orthostatic VS  10-01-24 @ 08:41  Lying BP: --/-- HR: --  Sitting BP: 98/60 HR: 58  Standing BP: 100/66 HR: 62  Site: --  Mode: --  Orthostatic VS  09-30-24 @ 08:41  Lying BP: --/-- HR: --  Sitting BP: 97/65 HR: 64  Standing BP: 90/63 HR: 80  Site: --  Mode: --    Lipid Panel: Date/Time: 09-23-24 @ 08:00  Cholesterol, Serum: 193  LDL Cholesterol Calculated: 111  HDL Cholesterol, Serum: 65  Total Cholesterol/HDL Ration Measurement: --  Triglycerides, Serum: 85

## 2024-10-01 NOTE — BH INPATIENT PSYCHIATRY PROGRESS NOTE - MSE UNSTRUCTURED FT
Appearance: fair grooming/hygiene  Behavior: calm, cooperative, no PMA/PMR  Speech: regular rate, rhythem, volume  Mood: "pretty good"  Affect: largely euthymic with only slight irritability  Thought process: still some looseness in associations but more linear and organized, able to clarify previous statements that were difficult to follow  Thought content: no len delusional content or paranoia  Perceptions: no AH, VH elicited, does not appear internally pre-occupied  Insight: partial  Judgement: partial  Gait: intact

## 2024-10-01 NOTE — BH INPATIENT PSYCHIATRY PROGRESS NOTE - CURRENT MEDICATION
MEDICATIONS  (STANDING):  ARIPiprazole 20 milliGRAM(s) Oral at bedtime  lithium 1050 milliGRAM(s) Oral at bedtime  LORazepam     Tablet 1 milliGRAM(s) Oral at bedtime    MEDICATIONS  (PRN):  acetaminophen     Tablet .. 650 milliGRAM(s) Oral every 6 hours PRN Mild Pain (1 - 3), Moderate Pain (4 - 6)  aluminum hydroxide/magnesium hydroxide/simethicone Suspension 30 milliLiter(s) Oral every 4 hours PRN Dyspepsia  haloperidol    Injectable 5 milliGRAM(s) IntraMuscular once PRN severe psychotic agitation  LORazepam     Tablet 1 milliGRAM(s) Oral every 4 hours PRN Insomnia/anxiety  LORazepam     Tablet 2 milliGRAM(s) Oral every 6 hours PRN agitation  LORazepam   Injectable 2 milliGRAM(s) IntraMuscular once PRN severe agitation  melatonin. 3 milliGRAM(s) Oral at bedtime PRN Insomnia  polyethylene glycol 3350 17 Gram(s) Oral daily PRN constipation  propranolol 10 milliGRAM(s) Oral three times a day PRN Restlessness

## 2024-10-02 PROCEDURE — 99232 SBSQ HOSP IP/OBS MODERATE 35: CPT

## 2024-10-02 RX ORDER — ARIPIPRAZOLE 5 MG/1
1 TABLET ORAL
Qty: 30 | Refills: 0
Start: 2024-10-02 | End: 2024-10-31

## 2024-10-02 RX ORDER — LITHIUM CARBONATE 300 MG
1 TABLET, EXTENDED RELEASE ORAL
Qty: 90 | Refills: 0
Start: 2024-10-02 | End: 2024-10-31

## 2024-10-02 RX ORDER — LITHIUM CARBONATE 300 MG
1 TABLET, EXTENDED RELEASE ORAL
Qty: 30 | Refills: 0
Start: 2024-10-02 | End: 2024-10-31

## 2024-10-02 RX ADMIN — Medication 1050 MILLIGRAM(S): at 20:27

## 2024-10-02 RX ADMIN — ARIPIPRAZOLE 20 MILLIGRAM(S): 5 TABLET ORAL at 20:28

## 2024-10-02 NOTE — BH INPATIENT PSYCHIATRY DISCHARGE NOTE - ATTENDING DISCHARGE PHYSICAL EXAMINATION:
Appearance: fair grooming/hygiene  Behavior: calm, cooperative, no PMA/PMR  Speech: regular rate, rhythm, volume  Mood: "fine"  Affect: euthymic, full range  Thought process: linear, logical, organized  Thought content: no len delusional content or paranoia  Perceptions: no AH, VH elicited, does not appear internally pre-occupied  Insight: fair  Judgement: fair  Gait: intact

## 2024-10-02 NOTE — BH INPATIENT PSYCHIATRY DISCHARGE NOTE - NSDCRECOMMENDLABFT_PSY_ALL_CORE
Please follow up with bloodwork for lithium as recommended by psychiatrist S Plasty Text: Given the location and shape of the defect, and the orientation of relaxed skin tension lines, an S-plasty was deemed most appropriate for repair.  Using a sterile surgical marker, the appropriate outline of the S-plasty was drawn, incorporating the defect and placing the expected incisions within the relaxed skin tension lines where possible.  The area thus outlined was incised deep to adipose tissue with a #15 scalpel blade.  The skin margins were undermined to an appropriate distance in all directions utilizing iris scissors. The skin flaps were advanced over the defect.  The opposing margins were then approximated with interrupted buried subcutaneous sutures.

## 2024-10-02 NOTE — BH INPATIENT PSYCHIATRY DISCHARGE NOTE - MSE UNSTRUCTURED FT
Appearance: fair grooming/hygiene  Behavior: calm, cooperative, no PMA/PMR  Speech: regular rate, rhythm, volume  Mood: "good"  Affect: euthymic, full range  Thought process: linear, logical, organized  Thought content: no len delusional content or paranoia  Perceptions: no AH, VH elicited, does not appear internally pre-occupied  Insight: improving  Judgement: fair  Gait: intact Appearance: fair grooming/hygiene  Behavior: calm, cooperative, no PMA/PMR  Speech: regular rate, rhythm, volume  Mood: "fine"  Affect: euthymic, full range  Thought process: linear, logical, organized  Thought content: no len delusional content or paranoia  Perceptions: no AH, VH elicited, does not appear internally pre-occupied  Insight: fair  Judgement: fair  Gait: intact
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
## 2024-10-02 NOTE — BH INPATIENT PSYCHIATRY PROGRESS NOTE - NSBHFUPINTERVALHXFT_PSY_A_CORE
VSS,  accepting medications, no PRNs for agitation, no acute events overnight    Patient reports she is doing well, though very bored here. States she is reading books that her parents brought her. Reports sleeping well last night, about 8 hours. Denies feeling confused, denies AH, VH, SI, HI. Discuss long term goals. Discuss medication and follow-up plan. Reports feeling a little blunted, but denies GI distress, stiffness, restlessness or tremor. Review advisory around lithium (avoiding NSAIDs, staying hydrated).     Family meeting held today with parents. Reviewed hospital course, medications and follow-up plan.

## 2024-10-02 NOTE — BH INPATIENT PSYCHIATRY PROGRESS NOTE - MSE UNSTRUCTURED FT
Appearance: fair grooming/hygiene  Behavior: calm, cooperative, no PMA/PMR  Speech: regular rate, rhythm, volume  Mood: "good"  Affect: euthymic, full range  Thought process: linear, logical, organized  Thought content: no len delusional content or paranoia  Perceptions: no AH, VH elicited, does not appear internally pre-occupied  Insight: improving  Judgement: fair  Gait: intact
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
## 2024-10-02 NOTE — BH INPATIENT PSYCHIATRY PROGRESS NOTE - NSBHASSESSSUMMFT_PSY_ALL_CORE
Patient is a 35 yo F, living with parents though was recently living in [place], unemployed, no known PMH, unclear PPHx (has been prescribed SSRI + aripiprazole and stimulant), last in psychiatric treatment with NP in [place], in treatment with individual therapist, no known inpatient psychiatric hospitalizations, no known suicide attempts, no known history of violence, who was BIB parents for increasing agitation, disorganization, poor sleep, pressured speech and physical aggression. She was admitted voluntarily.     This is a patient with unclear prior psychiatric diagnoses (prior treatment with SSRI/SGA augmentation and stimulant), who has had symptoms concerning for ashish beginning in spring 2024 and escalating significantly in the past few days prior to admission. Per patient and collateral, patient has been emotionally dysregulated, intermittently agitated, impulsive, hyperverbal, disorganized, and with intermittent poor sleep. She briefly endorsed AH to family and appeared internally pre-occupied in ED. On arrival to unit, received PRNs for disorganization and loud speech. On initial evaluation by treatment team, patient was notably tangential, over-inclusive and difficult to follow. Taken together, presentation most consistent with ashish/mixed episode with psychotic features (perceptual disturbances, disorganization) and underlying bipolar I disorder. Substance-induced mood episode unlikely, as patient denies recent use and urine toxicology negative and symptoms have persisted for many days since admission. Primary psychotic process also on differential, though less consistent with presentation/timeline. Per therapist, possible underlying BPD traits.    Patient significantly improved, today appearing euthymic, organized/linear and with interview content appropriate to situation (thinking about short and long term plans, insurance etc). No reported side effects from medications, besides feeling a little blunted. Patient amenable to follow-up with [facility] bipolar clinic. Awaiting insurance confirmation and appointment.    Plan:  1. Ibarra: admitted voluntarily 9.13; submitted 3DL on 9/25, retracted, then resubmitted on 9/26 and retracted 9/26  2. Observation: Routine checks  3. Collateral: obtained collateral from family and therapist  4. Psychiatric  -Continue aripiprazole 20mg QHS  -Continue lithium 1050mg QHS  	-Baseline Cr 0.78 (9/23), TSH 1.42 (9/21), EKG NSR, Upreg negative  	-Level 9/30 of 0.7 (at 900mg)  	-Lithium level, BMP and TSH on 10/4  -d/c lorazepam 1mg QHS --- though PRN available  ---Metabolic labs reviewed  5. Medical:  -Admission labs reviewed and notable for WBC 14.80 --> 8.04, K 3.4 --> 4.9, UA with bacteria, ketones, trace LE, CTH negative, will add on repeat UA (1 WBC, occasional bacteria)  -Add FEP labs: RPR (neg), Vitamin B12 (wnl), folate (wnl), YENNY (neg), ceruloplasmin (wnl), ESR (wnl). Admission EKG NSR, QTc 423ms.  6. PRNs  -Ativan 1mg PRN for insomnia/anxiety  -Ativan 2mg PO or IM for agitation  -Haldol 5mg/Ativan 2mg IM for severe agitation  7. Therapy   -Individual, group and milieu therapy as appropriate   8. Disposition: referral placed to [facility] bipolar clinic, clinically accepted, awating insurance and appt

## 2024-10-02 NOTE — BH INPATIENT PSYCHIATRY DISCHARGE NOTE - NSBHASSESSSUMMFT_PSY_ALL_CORE
Patient is a 33 yo F, living with parents though was recently living in [place], unemployed, no known PMH, unclear PPHx (has been prescribed SSRI + aripiprazole and stimulant), last in psychiatric treatment with NP in [place], in treatment with individual therapist, no known inpatient psychiatric hospitalizations, no known suicide attempts, no known history of violence, who was BIB parents for increasing agitation, disorganization, poor sleep, pressured speech and physical aggression. She was admitted voluntarily.     This is a patient with unclear prior psychiatric diagnoses (prior treatment with SSRI/SGA augmentation and stimulant), who has had symptoms concerning for ashish beginning in spring 2024 and escalating significantly in the past few days prior to admission. Per patient and collateral, patient has been emotionally dysregulated, intermittently agitated, impulsive, hyperverbal, disorganized, and with intermittent poor sleep. She briefly endorsed AH to family and appeared internally pre-occupied in ED. On arrival to unit, received PRNs for disorganization and loud speech. On initial evaluation by treatment team, patient was notably tangential, over-inclusive and difficult to follow. Taken together, presentation most consistent with ashish/mixed episode with psychotic features (perceptual disturbances, disorganization) and underlying bipolar I disorder. Substance-induced mood episode unlikely, as patient denies recent use and urine toxicology negative and symptoms have persisted for many days since admission. Primary psychotic process also on differential, though less consistent with presentation/timeline. Per therapist, possible underlying BPD traits.    ****    Plan:  -****  -Continue aripiprazole 20mg QHS  -Continue lithium 1050mg QHS  	-Baseline Cr 0.78 (9/23), TSH 1.42 (9/21), EKG NSR, Upreg negative  	-Level 9/30 of 0.7 (at 900mg)  	-Lithium level, BMP and TSH on 10/4 ****  -F/u appt with [facility] bipolar clinic ***  -F/u with prior individual therapist, Angus Lagos Patient is a 35 yo F, living with parents though was recently living in [place], unemployed, no known PMH, unclear PPHx (has been prescribed SSRI + aripiprazole and stimulant), last in psychiatric treatment with NP in [place], in treatment with individual therapist, no known inpatient psychiatric hospitalizations, no known suicide attempts, no known history of violence, who was BIB parents for increasing agitation, disorganization, poor sleep, pressured speech and physical aggression. She was admitted voluntarily.     This is a patient with unclear prior psychiatric diagnoses (prior treatment with SSRI/SGA augmentation and stimulant), who has had symptoms concerning for ashish beginning in spring 2024 and escalating significantly in the past few days prior to admission. Per patient and collateral, patient has been emotionally dysregulated, intermittently agitated, impulsive, hyperverbal, disorganized, and with intermittent poor sleep. She briefly endorsed AH to family and appeared internally pre-occupied in ED. On arrival to unit, received PRNs for disorganization and loud speech. On initial evaluation by treatment team, patient was notably tangential, over-inclusive and difficult to follow. Taken together, presentation most consistent with ashish/mixed episode with psychotic features (perceptual disturbances, disorganization) and underlying bipolar I disorder. Substance-induced mood episode unlikely, as patient denies recent use and urine toxicology negative and symptoms have persisted for many days since admission. Primary psychotic process also on differential, though less consistent with presentation/timeline. Per therapist, possible underlying BPD traits.    ****    Plan:  -****  -Continue aripiprazole 20mg QHS  -Continue lithium 1050mg QHS  	-Baseline Cr 0.78 (9/23), TSH 1.42 (9/21), EKG NSR, Upreg negative  	-Level 9/30 of 0.7 (at 900mg)  	-Lithium level, BMP and TSH on 10/4 ****  -F/u appt with [facility] bipolar clinic on 11/5 with bridge appointment at crisis clinic***  -F/u with prior individual therapist, Angus Lagos Patient is a 35 yo F, living with parents though was recently living in [place], unemployed, no known PMH, unclear PPHx (has been prescribed SSRI + aripiprazole and stimulant), last in psychiatric treatment with NP in [place], in treatment with individual therapist, no known inpatient psychiatric hospitalizations, no known suicide attempts, no known history of violence, who was BIB parents for increasing agitation, disorganization, poor sleep, pressured speech and physical aggression. She was admitted voluntarily.     On day of discharge, patient presents as euthymic, linear, reality-based and is sleeping adequately. Only noted side effect from medication is feeling somewhat mentally blunted, and discussed that medications can likely be decreased in outpatient setting with time. Safety precautions with lithium again reviewed.    Lithium level, creatinine and TSH obtained and reviewed today, wnl.    Plan:  -D/c today  -Continue aripiprazole 20mg QHS  -Continue lithium 1050mg QHS  	-Baseline Cr 0.78 (9/23), TSH 1.42 (9/21), EKG NSR, Upreg negative  	-Level 9/30 of 0.7 (at 900mg)  	-BMP and TSH 10.4 wnl, level 0.9 on 10/4 (1050mg)  -F/u appt with [facility] bipolar clinic on 11/5 with bridge appointment at crisis clinic  -F/u with prior individual therapist, Angus Lagos

## 2024-10-02 NOTE — BH INPATIENT PSYCHIATRY DISCHARGE NOTE - NSBHMETABOLIC_PSY_ALL_CORE_FT
Called to set up an appointment with Kisha Alberto. Request made to transfer to different provider. Phone call transferred to Kisha Alberto to follow up with transfer request and documentation as needed. BMI: BMI (kg/m2): 29.2 (09-21-24 @ 16:23)  HbA1c: A1C with Estimated Average Glucose Result: 5.3 % (09-23-24 @ 08:00)    Glucose:   BP: --Vital Signs Last 24 Hrs  T(C): 36.7 (10-02-24 @ 08:57), Max: 36.7 (10-02-24 @ 08:57)  T(F): 98.1 (10-02-24 @ 08:57), Max: 98.1 (10-02-24 @ 08:57)  HR: --  BP: --  BP(mean): --  RR: --  SpO2: --    Orthostatic VS  10-02-24 @ 08:57  Lying BP: --/-- HR: --  Sitting BP: 104/66 HR: 60  Standing BP: 103/63 HR: 71  Site: --  Mode: --  Orthostatic VS  10-01-24 @ 08:41  Lying BP: --/-- HR: --  Sitting BP: 98/60 HR: 58  Standing BP: 100/66 HR: 62  Site: --  Mode: --    Lipid Panel: Date/Time: 09-23-24 @ 08:00  Cholesterol, Serum: 193  LDL Cholesterol Calculated: 111  HDL Cholesterol, Serum: 65  Total Cholesterol/HDL Ration Measurement: --  Triglycerides, Serum: 85

## 2024-10-02 NOTE — BH INPATIENT PSYCHIATRY PROGRESS NOTE - NSBHCHARTREVIEWVS_PSY_A_CORE FT
Vital Signs Last 24 Hrs  T(C): 36.7 (10-02-24 @ 08:57), Max: 36.7 (10-02-24 @ 08:57)  T(F): 98.1 (10-02-24 @ 08:57), Max: 98.1 (10-02-24 @ 08:57)  HR: --  BP: --  BP(mean): --  RR: --  SpO2: --    Orthostatic VS  10-02-24 @ 08:57  Lying BP: --/-- HR: --  Sitting BP: 104/66 HR: 60  Standing BP: 103/63 HR: 71  Site: --  Mode: --  Orthostatic VS  10-01-24 @ 08:41  Lying BP: --/-- HR: --  Sitting BP: 98/60 HR: 58  Standing BP: 100/66 HR: 62  Site: --  Mode: --

## 2024-10-02 NOTE — BH INPATIENT PSYCHIATRY DISCHARGE NOTE - NSBHDCHANDOFFFT_PSY_ALL_CORE
Secure email sent to [facility] bipolar clinic team  For questions, can call Dr. Elina Kong, [phone]

## 2024-10-02 NOTE — BH INPATIENT PSYCHIATRY DISCHARGE NOTE - NSBHFUPINTERVALHXFT_PSY_A_CORE
VSS, accepting medications, no PRNs for agitation, no acute events overnight. Slept overnight per log. This morning, labs with Cr 0.8, TSH 1.25, Li level 0.9.    Patient reports feeling fine today, and is excited for discharge. Reports she slept on and off, but mostly through the night. Denies physical complaints though still feels somewhat mentally blunted. Discuss again that medications can likely be decreased with time in the outpatient setting. Denies SI, HI, AH, VH. Again advised around mantaining hydration and avoiding NSAIDs while taking lithium. D/c plan reviewed. Advised she can call 911 or return to ER at any time if feeling unsafe, or having symptoms concerning for lithium toxicity (which are reviewed).

## 2024-10-02 NOTE — BH INPATIENT PSYCHIATRY PROGRESS NOTE - CURRENT MEDICATION
MEDICATIONS  (STANDING):  ARIPiprazole 20 milliGRAM(s) Oral at bedtime  lithium 1050 milliGRAM(s) Oral at bedtime    MEDICATIONS  (PRN):  acetaminophen     Tablet .. 650 milliGRAM(s) Oral every 6 hours PRN Mild Pain (1 - 3), Moderate Pain (4 - 6)  aluminum hydroxide/magnesium hydroxide/simethicone Suspension 30 milliLiter(s) Oral every 4 hours PRN Dyspepsia  haloperidol    Injectable 5 milliGRAM(s) IntraMuscular once PRN severe psychotic agitation  LORazepam     Tablet 1 milliGRAM(s) Oral every 4 hours PRN Insomnia/anxiety  LORazepam     Tablet 2 milliGRAM(s) Oral every 6 hours PRN agitation  LORazepam   Injectable 2 milliGRAM(s) IntraMuscular once PRN severe agitation  melatonin. 3 milliGRAM(s) Oral at bedtime PRN Insomnia  polyethylene glycol 3350 17 Gram(s) Oral daily PRN constipation  propranolol 10 milliGRAM(s) Oral three times a day PRN Restlessness

## 2024-10-02 NOTE — BH INPATIENT PSYCHIATRY DISCHARGE NOTE - NSDCMRMEDTOKEN_GEN_ALL_CORE_FT
ARIPiprazole 20 mg oral tablet: 1 tab(s) orally once a day (at bedtime)  lithium 150 mg oral capsule: 1 cap(s) orally once a day (at bedtime) Take 1 capsule (150mg) along with 900mg at bedtime for a total of 1050mg  lithium 300 mg oral capsule: 1 cap(s) orally once a day (at bedtime) Take 3 capsules (900mg) along with 150mg capsule for a total of 1050mg daily

## 2024-10-02 NOTE — BH INPATIENT PSYCHIATRY PROGRESS NOTE - NSBHMETABOLIC_PSY_ALL_CORE_FT
BMI: BMI (kg/m2): 29.2 (09-21-24 @ 16:23)  HbA1c: A1C with Estimated Average Glucose Result: 5.3 % (09-23-24 @ 08:00)    Glucose:   BP: --Vital Signs Last 24 Hrs  T(C): 36.7 (10-02-24 @ 08:57), Max: 36.7 (10-02-24 @ 08:57)  T(F): 98.1 (10-02-24 @ 08:57), Max: 98.1 (10-02-24 @ 08:57)  HR: --  BP: --  BP(mean): --  RR: --  SpO2: --    Orthostatic VS  10-02-24 @ 08:57  Lying BP: --/-- HR: --  Sitting BP: 104/66 HR: 60  Standing BP: 103/63 HR: 71  Site: --  Mode: --  Orthostatic VS  10-01-24 @ 08:41  Lying BP: --/-- HR: --  Sitting BP: 98/60 HR: 58  Standing BP: 100/66 HR: 62  Site: --  Mode: --    Lipid Panel: Date/Time: 09-23-24 @ 08:00  Cholesterol, Serum: 193  LDL Cholesterol Calculated: 111  HDL Cholesterol, Serum: 65  Total Cholesterol/HDL Ration Measurement: --  Triglycerides, Serum: 85

## 2024-10-02 NOTE — BH INPATIENT PSYCHIATRY DISCHARGE NOTE - HOSPITAL COURSE
The patient was admitted to [facility] due to escalating symptoms of affective dysregulation, agitation, rapid/disorganized speech and poor sleep, which had started initially in spring 2024 and acutely increased in the days to weeks prior to admission. On admission, she was hyperverbal, pressured, over-inclusive and disorganized in thought process. Leading diagnosis was manic episode with psychotic features and underlying bipolar 1 disorder. Her history suggested possible background borderline traits, distinctive from the current episode, as current symptoms were described by family and therapist as being entirely new.    Regarding medications, aripiprazole was initiated and titrated to 20mg QHS. After a few days, lithium was also started and titrated to 1050mg**** with corresponding level of ****. Lorazepam was added at night to aid in sleep, though was able to be tapered before discharge once sleep was restored.    During her hospitalization, the patient was initially irritable, with intermittent episodes of verbal agitation and concerning behaviors (i.e. throwing things, dumping food on the table, taking pants off in common space) leading to the need for oral PRNs. She had fluctuating insight, at some points agreeing with the diagnosis of bipolar disorder and current ashish and desiring medication treatment, and at other times rejecting this diagnosis or presence of symptoms and pleading for discharge. During the initial week of hospitalization, she continued to be irritable, disorganized and sleeping very little.     Over the hospitalization, the patient gradually improved with eventual resolution of presenting symptoms. She became euthymic with resolved irritability. Sleep improved and patient was able to sleep through the night. She no longer demonstrated concerning behaviors or needed PRNs. Thought process became organized, linear and coherent. Insight remained partial, though she was amenable to continuing medications and following up with outpatient care.     Patient was referred to the bipolar clinic at [facility] prior to discharge ***    Family meeting was held on 10/2 to review hospitalization, medications and after-care plan.    Medically, patient remained stable and did not require consultation. First episode panel of labs were obtained and unremarkable.    Risk Assessment at discharge:  Patient is at CHRONIC elevated risk of harm to self and others due to mood disorder, history of aggression in the setting of mood symptoms and history of trauma. ACUTE risk factors on admission include recent move, manic and psychotic symptoms and lack of appropriate outpatient treatment. PROTECTIVE factors that mitigate this risk on discharge include resolution in manic and psychotic symptoms, coordination with family, outpatient appointment*** Patient is agreeable to call 911, or go to the nearest ED with any imminent safety concerns. Given the above, patient no longer appears to be at acutely increased risk of harm to self and others above chronic elevated risk and is appropriate for discharge.    Medications on discharge:  -Lithium 1050mg QHS, corresponding level of ***  -Aripiprazole 20mg QHS     The patient was admitted to [facility] due to escalating symptoms of affective dysregulation, agitation, rapid/disorganized speech and poor sleep, which had started initially in spring 2024 and acutely increased in the days to weeks prior to admission. On admission, she was hyperverbal, pressured, over-inclusive and disorganized in thought process. Leading diagnosis was manic episode with psychotic features and underlying bipolar 1 disorder. Her history suggested possible background borderline traits, distinctive from the current episode, as current symptoms were described by family and therapist as being entirely new.    Regarding medications, aripiprazole was initiated and titrated to 20mg QHS. After a few days, lithium was also started and titrated to 1050mg with corresponding level of ****. Lorazepam was added at night to aid in sleep, though was able to be tapered before discharge once sleep was restored.    During her hospitalization, the patient was initially irritable, with intermittent episodes of verbal agitation and concerning behaviors (i.e. throwing things, dumping food on the table, taking pants off in common space) leading to the need for oral PRNs. She had fluctuating insight, at some points agreeing with the diagnosis of bipolar disorder and current ashish and desiring medication treatment, and at other times rejecting this diagnosis or presence of symptoms and pleading for discharge. During the initial week of hospitalization, she continued to be irritable, disorganized and sleeping very little.     Over the hospitalization, the patient gradually improved with eventual resolution of presenting symptoms. She became euthymic with resolved irritability. Sleep improved and patient was able to sleep through the night. She no longer demonstrated concerning behaviors or needed PRNs. Thought process became organized, linear and coherent. Insight remained partial, though she was amenable to continuing medications and following up with outpatient care.     Patient was referred to the bipolar clinic at [facility] prior to discharge, with follow-up appointment scheduled on 11/5, as well as bridge appointment at crisis clinic.    Family meeting was held on 10/2 to review hospitalization, medications and after-care plan.    Medically, patient remained stable and did not require consultation. First episode panel of labs were obtained and unremarkable.    Risk Assessment at discharge:  Patient is at CHRONIC elevated risk of harm to self and others due to mood disorder, history of aggression in the setting of mood symptoms and history of trauma. ACUTE risk factors on admission include recent move, manic and psychotic symptoms and lack of appropriate outpatient treatment. PROTECTIVE factors that mitigate this risk on discharge include resolution in manic and psychotic symptoms, coordination with family and outpatient appointment at specialized clinic. Patient is agreeable to call 911, or go to the nearest ED with any imminent safety concerns. Given the above, patient no longer appears to be at acutely increased risk of harm to self and others above chronic elevated risk and is appropriate for discharge.    Medications on discharge:  -Lithium 1050mg QHS, corresponding level of ***  -Aripiprazole 20mg QHS       The patient was admitted to [facility] due to escalating symptoms of affective dysregulation, agitation, rapid/disorganized speech and poor sleep, which had started initially in spring 2024 and acutely increased in the days to weeks prior to admission. On admission, she was hyperverbal, pressured, over-inclusive and disorganized in thought process. Leading diagnosis was manic episode with psychotic features and underlying bipolar 1 disorder. Her history suggested possible background borderline traits, distinctive from the current episode, as current symptoms were described by family and therapist as being entirely new.    Regarding medications, aripiprazole was initiated and titrated to 20mg QHS. After a few days, lithium was also started and titrated to 1050mg with corresponding level of 0.9. Lorazepam was added at night to aid in sleep, though was able to be tapered before discharge once sleep was restored. Only reported side effects to medication was some mental blunting.    During her hospitalization, the patient was initially irritable, with intermittent episodes of verbal agitation and concerning behaviors (i.e. throwing things, dumping food on the table, taking pants off in common space) leading to the need for oral PRNs on a few occasions. She had fluctuating insight, at some points agreeing with the diagnosis of bipolar disorder and current ashish and desiring medication treatment, and at other times rejecting this diagnosis or presence of symptoms. During the initial week of hospitalization, she continued to be irritable, disorganized and sleeping very little.     Over the hospitalization, the patient gradually improved with eventual resolution of presenting symptoms. She became euthymic with resolved irritability. Sleep improved and patient was able to sleep through the night. She no longer demonstrated concerning behaviors or needed PRNs. Thought process became organized, linear and coherent. Insight remained partial, though she was amenable to continuing medications and following up with outpatient care.     Patient was referred to the bipolar clinic at [facility] prior to discharge, with follow-up appointment scheduled on 11/5, as well as bridge appointment at crisis clinic.    Family meeting was held on 10/2 to review hospitalization, medications and after-care plan.    Medically, patient remained stable and did not require consultation. First episode panel of labs were obtained and unremarkable.    Patient advised to maintain adequate hydration with lithium and avoid NSAIDs, as well as return to ER if signs of lithium toxicity.    Risk Assessment at discharge:  Patient is at CHRONIC elevated risk of harm to self and others due to mood disorder, history of aggression in the setting of mood symptoms and history of trauma. ACUTE risk factors on admission include recent move, manic and psychotic symptoms and lack of appropriate outpatient treatment. PROTECTIVE factors that mitigate this risk on discharge include resolution in manic and psychotic symptoms, coordination with family, initiation of lithium and aripiprazole, and outpatient appointment at specialized bipolar clinic. Patient is agreeable to call 911, or go to the nearest ED with any imminent safety concerns. Given the above, patient no longer appears to be at acutely increased risk of harm to self and others above chronic elevated risk and is appropriate for discharge.    Medications on discharge:  -Lithium 1050mg QHS, corresponding level of 0.9  -Aripiprazole 20mg QHS

## 2024-10-03 PROCEDURE — 99232 SBSQ HOSP IP/OBS MODERATE 35: CPT

## 2024-10-03 RX ORDER — DIPHENHYDRAMINE HCL 12.5MG/5ML
25 LIQUID (ML) ORAL AT BEDTIME
Refills: 0 | Status: DISCONTINUED | OUTPATIENT
Start: 2024-10-03 | End: 2024-10-04

## 2024-10-03 RX ADMIN — ARIPIPRAZOLE 20 MILLIGRAM(S): 5 TABLET ORAL at 20:18

## 2024-10-03 RX ADMIN — Medication 1050 MILLIGRAM(S): at 20:17

## 2024-10-03 NOTE — BH INPATIENT PSYCHIATRY PROGRESS NOTE - NSTXDISORGINTERMD_PSY_ALL_CORE
Med management with Abilify and lithium
Med management with Abilify, consider lithium
Med management with Abilify and lithium
Med management with Abilify, consider lithium

## 2024-10-03 NOTE — BH TREATMENT PLAN - NSTXDISORGINTERMD_PSY_ALL_CORE
Med management with Abilify and lithium
Med management with Abilify, consider lithium
Med management with Abilify and lithium

## 2024-10-03 NOTE — BH TREATMENT PLAN - NSTXDISORGINTERPR_PSY_ALL_CORE
Writer met with patient to assess progress towards psychiatric rehabilitation goal. Patient was receptive to meeting with writer and moderately engaged. Patient has made some progress towards psychiatric rehabilitation goal of making at least 3 goal and reality oriented statements during therapy. Patient minimally attends psychiatric rehabilitation groups despite staff encouragement. Psychiatric rehabilitation recommends patient continue to attend groups and activities in the milieu and participate in 1:1 engagement to continue exploring coping skills to manage symptoms.
Patient will work towards goal of making at least 3 reality/goal related statements. Progress will be measured by patients ability to organize her thoughts in a conversation and establish workable goals towards the future. Patient is advised to attend 1-2 Psychiatric Rehabilitation groups on a daily basis in addition to complying with [facility] staff/providers to maximize successful treatment.
Patient will work towards goal of making at least 3 reality/goal related statements. Progress will be measured by patients ability to organize her thoughts in a conversation and establish workable goals towards the future. Patient is advised to attend 1-2 Psychiatric Rehabilitation groups on a daily basis in addition to complying with [facility] staff/providers to maximize successful treatment.

## 2024-10-03 NOTE — BH INPATIENT PSYCHIATRY PROGRESS NOTE - NSICDXBHPRIMARYDX_PSY_ALL_CORE
Sarita   F30.9  

## 2024-10-03 NOTE — BH TREATMENT PLAN - NSTXDISORGGOAL_PSY_ALL_CORE
----- Message from Teresa Segura sent at 3/1/2019  4:05 PM CST -----  Contact: Self            Name of Who is Calling: MALLY WILL [[id number]]      What is the request in detail: Pt states she is checking the status of a previous request to have her two medications, Xanax and Adderrall filled. Please contact to further discuss and advise.        Can the clinic reply by [facility]: Y      What Number to Call Back if not in [facility]: [phone]                                    
Will make at least 3 goal and reality oriented statements during therapy

## 2024-10-03 NOTE — BH INPATIENT PSYCHIATRY PROGRESS NOTE - NSTREATMENTCERTY_PSY_ALL_CORE

## 2024-10-03 NOTE — BH TREATMENT PLAN - NSTXCAREGIVERPARTICIPATE_PSY_P_CORE
Family/Caregiver participated in identification of needs/problems/goals for treatment/Family/Caregiver participated in defining interventions/Family/Caregiver participated in development of after care plan
Family/Caregiver participated in identification of needs/problems/goals for treatment/Family/Caregiver participated in defining interventions
Family/Caregiver participated in identification of needs/problems/goals for treatment/Family/Caregiver participated in defining interventions/Family/Caregiver participated in development of after care plan

## 2024-10-03 NOTE — BH INPATIENT PSYCHIATRY PROGRESS NOTE - NSBHCONSDANGEROTHERS_PSY_A_CORE
high risk for assault

## 2024-10-03 NOTE — BH INPATIENT PSYCHIATRY PROGRESS NOTE - NSBHMETABOLIC_PSY_ALL_CORE_FT
BMI: BMI (kg/m2): 29.2 (09-21-24 @ 16:23)  HbA1c: A1C with Estimated Average Glucose Result: 5.3 % (09-23-24 @ 08:00)    Glucose:   BP: --Vital Signs Last 24 Hrs  T(C): 37.1 (10-03-24 @ 07:44), Max: 37.1 (10-03-24 @ 07:44)  T(F): 98.7 (10-03-24 @ 07:44), Max: 98.7 (10-03-24 @ 07:44)  HR: --  BP: --  BP(mean): --  RR: --  SpO2: --    Orthostatic VS  10-03-24 @ 07:44  Lying BP: --/-- HR: --  Sitting BP: 109/67 HR: 66  Standing BP: 115/66 HR: 79  Site: --  Mode: --  Orthostatic VS  10-02-24 @ 08:57  Lying BP: --/-- HR: --  Sitting BP: 104/66 HR: 60  Standing BP: 103/63 HR: 71  Site: --  Mode: --    Lipid Panel: Date/Time: 09-23-24 @ 08:00  Cholesterol, Serum: 193  LDL Cholesterol Calculated: 111  HDL Cholesterol, Serum: 65  Total Cholesterol/HDL Ration Measurement: --  Triglycerides, Serum: 85

## 2024-10-03 NOTE — BH INPATIENT PSYCHIATRY PROGRESS NOTE - NSBHASSESSSUMMFT_PSY_ALL_CORE
Patient is a 33 yo F, living with parents though was recently living in [place], unemployed, no known PMH, unclear PPHx (has been prescribed SSRI + aripiprazole and stimulant), last in psychiatric treatment with NP in [place], in treatment with individual therapist, no known inpatient psychiatric hospitalizations, no known suicide attempts, no known history of violence, who was BIB parents for increasing agitation, disorganization, poor sleep, pressured speech and physical aggression. She was admitted voluntarily.     Patient continues to present with largely resolved mood symptoms, appearing euthymic, organized/linear and with interview content appropriate to situation. Discuss family meeting and related dynamics at length today. No reported side effects from medications, besides feeling a little blunted/clouded. Adding diphenhydramine PRN for sleep. Patient amenable to follow-up with [facility] bipolar clinic. Awaiting appointment, and will obtain lithium labs tomorrow.    Plan:  1. Ibarra: admitted voluntarily 9.13; submitted 3DL on 9/25, retracted, then resubmitted on 9/26 and retracted 9/26  2. Observation: Routine checks  3. Collateral: obtained collateral from family and therapist  4. Psychiatric  -Continue aripiprazole 20mg QHS  -Continue lithium 1050mg QHS  	-Baseline Cr 0.78 (9/23), TSH 1.42 (9/21), EKG NSR, Upreg negative  	-Level 9/30 of 0.7 (at 900mg)  	-Lithium level, BMP and TSH on 10/4  -Add diphenhydramine PRN for sleep  -d/c lorazepam 1mg QHS --- though PRN available  ---Metabolic labs reviewed  5. Medical:  -Admission labs reviewed and notable for WBC 14.80 --> 8.04, K 3.4 --> 4.9, UA with bacteria, ketones, trace LE, CTH negative, will add on repeat UA (1 WBC, occasional bacteria)  -Add FEP labs: RPR (neg), Vitamin B12 (wnl), folate (wnl), YENNY (neg), ceruloplasmin (wnl), ESR (wnl). Admission EKG NSR, QTc 423ms.  6. PRNs  -Ativan 1mg PRN for insomnia/anxiety  -Ativan 2mg PO or IM for agitation  -Haldol 5mg/Ativan 2mg IM for severe agitation  7. Therapy   -Individual, group and milieu therapy as appropriate   8. Disposition: referral placed to [facility] bipolar clinic, clinically accepted, awating insurance and appt

## 2024-10-03 NOTE — BH INPATIENT PSYCHIATRY PROGRESS NOTE - NSBHFUPINTERVALCCFT_PSY_A_CORE
Follow-up for likely ashish. Patient seen, chart reviewed, discussed with team.

## 2024-10-03 NOTE — BH INPATIENT PSYCHIATRY PROGRESS NOTE - NSBHCONSBHPROVDETAILS_PSY_A_CORE  FT
Spoke to patient's therapist, Angus Lagos, on 9/25. Has known patient for years, and noted symptoms consistent with possible underlying bipolar and/or BPD. States in past month, has been totally different person, presenting manic and psychotic, disorganized, concerned about safety in this state. Notes she stopped taking all medications a few months ago. 

## 2024-10-03 NOTE — BH INPATIENT PSYCHIATRY PROGRESS NOTE - CURRENT MEDICATION
MEDICATIONS  (STANDING):  ARIPiprazole 20 milliGRAM(s) Oral at bedtime  lithium 1050 milliGRAM(s) Oral at bedtime    MEDICATIONS  (PRN):  acetaminophen     Tablet .. 650 milliGRAM(s) Oral every 6 hours PRN Mild Pain (1 - 3), Moderate Pain (4 - 6)  aluminum hydroxide/magnesium hydroxide/simethicone Suspension 30 milliLiter(s) Oral every 4 hours PRN Dyspepsia  diphenhydrAMINE 25 milliGRAM(s) Oral at bedtime PRN Insomnia  haloperidol    Injectable 5 milliGRAM(s) IntraMuscular once PRN severe psychotic agitation  LORazepam     Tablet 1 milliGRAM(s) Oral every 4 hours PRN Insomnia/anxiety  LORazepam     Tablet 2 milliGRAM(s) Oral every 6 hours PRN agitation  LORazepam   Injectable 2 milliGRAM(s) IntraMuscular once PRN severe agitation  melatonin. 3 milliGRAM(s) Oral at bedtime PRN Insomnia  polyethylene glycol 3350 17 Gram(s) Oral daily PRN constipation  propranolol 10 milliGRAM(s) Oral three times a day PRN Restlessness

## 2024-10-03 NOTE — BH TREATMENT PLAN - NSTXDCOPNOINTERSW_PSY_ALL_CORE
Ongoing support, psychoed and encouragment provided in effort to comply with meds, tx and discharge plans.
Ongoing support, psychoed and encouragment provided in effort to comply with meds, tx and discharge plans.
Ongoing support, psychoed and encouragement provided in effort to comply with meds, tx and discharge plans

## 2024-10-03 NOTE — BH INPATIENT PSYCHIATRY PROGRESS NOTE - PRN MEDS
MEDICATIONS  (PRN):  acetaminophen     Tablet .. 650 milliGRAM(s) Oral every 6 hours PRN Mild Pain (1 - 3), Moderate Pain (4 - 6)  aluminum hydroxide/magnesium hydroxide/simethicone Suspension 30 milliLiter(s) Oral every 4 hours PRN Dyspepsia  diphenhydrAMINE 25 milliGRAM(s) Oral at bedtime PRN Insomnia  haloperidol    Injectable 5 milliGRAM(s) IntraMuscular once PRN severe psychotic agitation  LORazepam     Tablet 1 milliGRAM(s) Oral every 4 hours PRN Insomnia/anxiety  LORazepam     Tablet 2 milliGRAM(s) Oral every 6 hours PRN agitation  LORazepam   Injectable 2 milliGRAM(s) IntraMuscular once PRN severe agitation  melatonin. 3 milliGRAM(s) Oral at bedtime PRN Insomnia  polyethylene glycol 3350 17 Gram(s) Oral daily PRN constipation  propranolol 10 milliGRAM(s) Oral three times a day PRN Restlessness

## 2024-10-03 NOTE — BH INPATIENT PSYCHIATRY PROGRESS NOTE - NSBHCHARTREVIEWVS_PSY_A_CORE FT
Vital Signs Last 24 Hrs  T(C): 37.1 (10-03-24 @ 07:44), Max: 37.1 (10-03-24 @ 07:44)  T(F): 98.7 (10-03-24 @ 07:44), Max: 98.7 (10-03-24 @ 07:44)  HR: --  BP: --  BP(mean): --  RR: --  SpO2: --    Orthostatic VS  10-03-24 @ 07:44  Lying BP: --/-- HR: --  Sitting BP: 109/67 HR: 66  Standing BP: 115/66 HR: 79  Site: --  Mode: --  Orthostatic VS  10-02-24 @ 08:57  Lying BP: --/-- HR: --  Sitting BP: 104/66 HR: 60  Standing BP: 103/63 HR: 71  Site: --  Mode: --

## 2024-10-03 NOTE — BH INPATIENT PSYCHIATRY PROGRESS NOTE - NS ED BHA REVIEW OF ED CHART AVAILABLE INVESTIGATIONS REVIEWED
None available
Yes
None available
None available
Yes
None available

## 2024-10-03 NOTE — BH INPATIENT PSYCHIATRY PROGRESS NOTE - NSTXDISORGDATEEST_PSY_ALL_CORE
22-Sep-2024

## 2024-10-03 NOTE — BH INPATIENT PSYCHIATRY PROGRESS NOTE - NSTXDCOPNODATEEST_PSY_ALL_CORE
23-Sep-2024
23-Sep-2024
01-Oct-2024
23-Sep-2024
01-Oct-2024
23-Sep-2024
01-Oct-2024

## 2024-10-03 NOTE — BH INPATIENT PSYCHIATRY PROGRESS NOTE - NSBHFUPINTERVALHXFT_PSY_A_CORE
VSS, accepting medications, no PRNs for agitation, no acute events overnight. Slept through the night, per log.    Patient states she had a hard time sleeping last night, and feels it may have been related to the stress from the family meeting. Discuss the family meeting at length, thoughts on approaching family dynamics and future goals. Patient appears very grounded, reality based, focused. Denies AH, VH, SI, HI. Does describe feeling clouded from the medication, and reviewed that she can likely decrease her dose of both Abilify and lithium in the outpatient setting.    Expressed frustration an intrusive/inappropriate patient, which was reported to nursing staff/nursing manager.

## 2024-10-03 NOTE — BH INPATIENT PSYCHIATRY PROGRESS NOTE - NSTXDISORGDATETRGT_PSY_ALL_CORE
06-Oct-2024
29-Sep-2024
06-Oct-2024
29-Sep-2024
06-Oct-2024
29-Sep-2024
06-Oct-2024

## 2024-10-03 NOTE — BH INPATIENT PSYCHIATRY PROGRESS NOTE - MSE UNSTRUCTURED FT
Appearance: fair grooming/hygiene  Behavior: calm, cooperative, no PMA/PMR  Speech: regular rate, rhythm, volume  Mood: anxious  Affect: euthymic, full range  Thought process: linear, logical, organized  Thought content: no len delusional content or paranoia  Perceptions: no AH, VH elicited, does not appear internally pre-occupied  Insight: improving  Judgement: fair  Gait: intact

## 2024-10-03 NOTE — BH INPATIENT PSYCHIATRY PROGRESS NOTE - NSTXDCOPNODATETRGT_PSY_ALL_CORE
30-Sep-2024
30-Sep-2024
04-Oct-2024
04-Oct-2024
30-Sep-2024
04-Oct-2024
30-Sep-2024

## 2024-10-03 NOTE — BH INPATIENT PSYCHIATRY PROGRESS NOTE - NS ED BHA REVIEW OF ED CHART AVAILABLE LABS REVIEWED
None available
None available
Yes
Yes
None available

## 2024-10-03 NOTE — BH INPATIENT PSYCHIATRY PROGRESS NOTE - NSBHATTESTBILLING_PSY_A_CORE
63420-Xmvloirqjr OBS or IP - high complexity OR 50-79 mins
21773-Fpkqokhrio OBS or IP - moderate complexity OR 35-49 mins
40859-Tzrpgwsswx OBS or IP - high complexity OR 50-79 mins
85510-Thuhpqpobu OBS or IP - moderate complexity OR 35-49 mins
12110-Aylpwwhsmn OBS or IP - high complexity OR 50-79 mins
98204-Kftpuqemgj OBS or IP - moderate complexity OR 35-49 mins
79082-Ltfmibsmhn OBS or IP - moderate complexity OR 35-49 mins
73872-Drbngzxptg OBS or IP - moderate complexity OR 35-49 mins
66926-Nzxbynhbvn OBS or IP - moderate complexity OR 35-49 mins

## 2024-10-04 VITALS — TEMPERATURE: 98 F

## 2024-10-04 LAB
ANION GAP SERPL CALC-SCNC: 12 MMOL/L — SIGNIFICANT CHANGE UP (ref 7–14)
BUN SERPL-MCNC: 12 MG/DL — SIGNIFICANT CHANGE UP (ref 7–23)
CALCIUM SERPL-MCNC: 10 MG/DL — SIGNIFICANT CHANGE UP (ref 8.4–10.5)
CHLORIDE SERPL-SCNC: 101 MMOL/L — SIGNIFICANT CHANGE UP (ref 98–107)
CO2 SERPL-SCNC: 26 MMOL/L — SIGNIFICANT CHANGE UP (ref 22–31)
CREAT SERPL-MCNC: 0.8 MG/DL — SIGNIFICANT CHANGE UP (ref 0.5–1.3)
EGFR: 100 ML/MIN/1.73M2 — SIGNIFICANT CHANGE UP
GLUCOSE SERPL-MCNC: 123 MG/DL — HIGH (ref 70–99)
LITHIUM SERPL-MCNC: 0.9 MMOL/L — SIGNIFICANT CHANGE UP (ref 0.6–1.2)
POTASSIUM SERPL-MCNC: 3.7 MMOL/L — SIGNIFICANT CHANGE UP (ref 3.5–5.3)
POTASSIUM SERPL-SCNC: 3.7 MMOL/L — SIGNIFICANT CHANGE UP (ref 3.5–5.3)
SODIUM SERPL-SCNC: 139 MMOL/L — SIGNIFICANT CHANGE UP (ref 135–145)
TSH SERPL-MCNC: 1.25 UIU/ML — SIGNIFICANT CHANGE UP (ref 0.27–4.2)

## 2024-10-04 NOTE — BH DISCHARGE NOTE NURSING/SOCIAL WORK/PSYCH REHAB - NSBHDCADDR2FT_A_CORE
[facility]- 1st floor  [address]  [address] to enter the building  [place] [address]

## 2024-10-04 NOTE — BH DISCHARGE NOTE NURSING/SOCIAL WORK/PSYCH REHAB - NSDCPRRECOMMEND_PSY_ALL_CORE
Patient will attend the [facility] for ongoing medication management, support, and psychotherapy.

## 2024-10-04 NOTE — BH DISCHARGE NOTE NURSING/SOCIAL WORK/PSYCH REHAB - NSDCPRGOAL_PSY_ALL_CORE
Writer met with patient in order to review progress toward rehabilitation goals and assess current functioning. Patient was hospitalized due to worsening mood lability, disorganization, and agitation. Patient has made progress toward her goals and functioning on the unit. Patient was able to engage with writer without disorganization. Patient was calm and cooperative on the unit. Patient was compliant with medication, and verbally agreed to continue with her medication and treatment after discharge.   Patient did not attend groups on the unit, and generally kept to herself. Patient was cooperative and forthcoming with information during individual sessions. Patient maintained her ADLs and appeared well groomed.

## 2024-10-04 NOTE — BH DISCHARGE NOTE NURSING/SOCIAL WORK/PSYCH REHAB - NSBHDCADDR1FT_A_CORE
[address] [place] [address], [facility], First Floor, [place] [address], [facility]

## 2024-10-04 NOTE — BH DISCHARGE NOTE NURSING/SOCIAL WORK/PSYCH REHAB - PATIENT PORTAL LINK FT
You can access the FollowMyHealth Patient Portal offered by [facility] by registering at the following website: [email or web address]. By joining [employer]’s FollowMyHealth portal, you will also be able to view your health information using other applications (apps) compatible with our system.

## 2024-10-04 NOTE — BH DISCHARGE NOTE NURSING/SOCIAL WORK/PSYCH REHAB - DISCHARGE INSTRUCTIONS AFTERCARE APPOINTMENTS
In order to check the location, date, or time of your aftercare appointment, please refer to your Discharge Instructions Document given to you upon leaving the hospital.  If you have lost the instructions please call [phone]

## 2024-10-09 ENCOUNTER — OUTPATIENT (OUTPATIENT)
Dept: OUTPATIENT SERVICES | Facility: HOSPITAL | Age: 34
LOS: 1 days | Discharge: ANOTHER HOSPITAL | End: 2024-10-09
Payer: MEDICAID

## 2024-10-09 PROCEDURE — 90833 PSYTX W PT W E/M 30 MIN: CPT

## 2024-10-09 PROCEDURE — 99214 OFFICE O/P EST MOD 30 MIN: CPT

## 2024-10-15 DIAGNOSIS — F31.12 BIPOLAR DISORDER, CURRENT EPISODE MANIC WITHOUT PSYCHOTIC FEATURES, MODERATE: ICD-10-CM

## 2024-10-17 PROCEDURE — 99214 OFFICE O/P EST MOD 30 MIN: CPT

## 2025-04-22 ENCOUNTER — APPOINTMENT (OUTPATIENT)
Dept: FAMILY MEDICINE | Facility: CLINIC | Age: 35
End: 2025-04-22
Payer: MEDICAID

## 2025-04-22 VITALS
HEART RATE: 83 BPM | HEIGHT: 64 IN | BODY MASS INDEX: 30.22 KG/M2 | OXYGEN SATURATION: 97 % | WEIGHT: 177 LBS | DIASTOLIC BLOOD PRESSURE: 68 MMHG | SYSTOLIC BLOOD PRESSURE: 102 MMHG | TEMPERATURE: 97.3 F

## 2025-04-22 DIAGNOSIS — F31.9 BIPOLAR DISORDER, UNSPECIFIED: ICD-10-CM

## 2025-04-22 DIAGNOSIS — Z11.1 ENCOUNTER FOR SCREENING FOR RESPIRATORY TUBERCULOSIS: ICD-10-CM

## 2025-04-22 DIAGNOSIS — Z92.29 PERSONAL HISTORY OF OTHER DRUG THERAPY: ICD-10-CM

## 2025-04-22 DIAGNOSIS — Z82.61 FAMILY HISTORY OF ARTHRITIS: ICD-10-CM

## 2025-04-22 DIAGNOSIS — Z00.00 ENCOUNTER FOR GENERAL ADULT MEDICAL EXAMINATION W/OUT ABNORMAL FINDINGS: ICD-10-CM

## 2025-04-22 PROCEDURE — 99385 PREV VISIT NEW AGE 18-39: CPT | Mod: 25

## 2025-04-22 RX ORDER — CARIPRAZINE 1.5 MG/1
1.5 CAPSULE, GELATIN COATED ORAL
Refills: 0 | Status: ACTIVE | COMMUNITY

## 2025-04-23 LAB
ALBUMIN SERPL ELPH-MCNC: 4.3 G/DL
ALP BLD-CCNC: 66 U/L
ALT SERPL-CCNC: 15 U/L
ANION GAP SERPL CALC-SCNC: 12 MMOL/L
APPEARANCE: CLEAR
AST SERPL-CCNC: 16 U/L
BASOPHILS # BLD AUTO: 0.05 K/UL
BASOPHILS NFR BLD AUTO: 0.8 %
BILIRUB SERPL-MCNC: 0.4 MG/DL
BILIRUBIN URINE: NEGATIVE
BLOOD URINE: NEGATIVE
BUN SERPL-MCNC: 14 MG/DL
CALCIUM SERPL-MCNC: 9.7 MG/DL
CHLORIDE SERPL-SCNC: 103 MMOL/L
CHOLEST SERPL-MCNC: 213 MG/DL
CO2 SERPL-SCNC: 23 MMOL/L
COLOR: YELLOW
CREAT SERPL-MCNC: 0.71 MG/DL
EGFRCR SERPLBLD CKD-EPI 2021: 115 ML/MIN/1.73M2
EOSINOPHIL # BLD AUTO: 0.16 K/UL
EOSINOPHIL NFR BLD AUTO: 2.6 %
GLUCOSE QUALITATIVE U: NEGATIVE MG/DL
GLUCOSE SERPL-MCNC: 93 MG/DL
HBV SURFACE AB SER QL: REACTIVE
HCT VFR BLD CALC: 41.6 %
HDLC SERPL-MCNC: 79 MG/DL
HGB BLD-MCNC: 13.6 G/DL
IMM GRANULOCYTES NFR BLD AUTO: 0.3 %
KETONES URINE: NEGATIVE MG/DL
LDLC SERPL-MCNC: 122 MG/DL
LEUKOCYTE ESTERASE URINE: NEGATIVE
LYMPHOCYTES # BLD AUTO: 2.63 K/UL
LYMPHOCYTES NFR BLD AUTO: 42 %
MAN DIFF?: NORMAL
MCHC RBC-ENTMCNC: 29.4 PG
MCHC RBC-ENTMCNC: 32.7 G/DL
MCV RBC AUTO: 89.8 FL
MEV IGG FLD QL IA: >300 AU/ML
MEV IGG+IGM SER-IMP: POSITIVE
MONOCYTES # BLD AUTO: 0.58 K/UL
MONOCYTES NFR BLD AUTO: 9.3 %
MUV AB SER-ACNC: POSITIVE
MUV IGG SER QL IA: >300 AU/ML
NEUTROPHILS # BLD AUTO: 2.82 K/UL
NEUTROPHILS NFR BLD AUTO: 45 %
NITRITE URINE: NEGATIVE
NONHDLC SERPL-MCNC: 134 MG/DL
PH URINE: 5.5
PLATELET # BLD AUTO: 154 K/UL
POTASSIUM SERPL-SCNC: 4.7 MMOL/L
PROT SERPL-MCNC: 7.2 G/DL
PROTEIN URINE: NEGATIVE MG/DL
RBC # BLD: 4.63 M/UL
RBC # FLD: 13.8 %
RUBV IGG FLD-ACNC: 8.73 INDEX
RUBV IGG SER-IMP: POSITIVE
SODIUM SERPL-SCNC: 139 MMOL/L
SPECIFIC GRAVITY URINE: 1.02
TRIGL SERPL-MCNC: 65 MG/DL
UROBILINOGEN URINE: 0.2 MG/DL
VZV AB TITR SER: POSITIVE
VZV IGG SER IF-ACNC: 13 S/CO
WBC # FLD AUTO: 6.26 K/UL

## 2025-04-24 LAB
M TB IFN-G BLD-IMP: NEGATIVE
QUANTIFERON TB PLUS MITOGEN MINUS NIL: >10 IU/ML
QUANTIFERON TB PLUS NIL: 0.05 IU/ML
QUANTIFERON TB PLUS TB1 MINUS NIL: 0.01 IU/ML
QUANTIFERON TB PLUS TB2 MINUS NIL: 0.01 IU/ML